# Patient Record
Sex: MALE | Race: BLACK OR AFRICAN AMERICAN | Employment: PART TIME | URBAN - METROPOLITAN AREA
[De-identification: names, ages, dates, MRNs, and addresses within clinical notes are randomized per-mention and may not be internally consistent; named-entity substitution may affect disease eponyms.]

---

## 2020-12-11 ENCOUNTER — OFFICE VISIT (OUTPATIENT)
Dept: PRIMARY CARE CLINIC | Age: 30
End: 2020-12-11

## 2020-12-11 VITALS
HEART RATE: 82 BPM | SYSTOLIC BLOOD PRESSURE: 105 MMHG | RESPIRATION RATE: 20 BRPM | OXYGEN SATURATION: 98 % | BODY MASS INDEX: 23.62 KG/M2 | HEIGHT: 70 IN | WEIGHT: 165 LBS | DIASTOLIC BLOOD PRESSURE: 55 MMHG | TEMPERATURE: 97.5 F

## 2020-12-11 DIAGNOSIS — Z20.822 EXPOSURE TO COVID-19 VIRUS: ICD-10-CM

## 2020-12-11 LAB
Lab: NORMAL
QC PASS/FAIL: NORMAL
SARS-COV-2, POC: NORMAL

## 2020-12-11 PROCEDURE — 87426 SARSCOV CORONAVIRUS AG IA: CPT | Performed by: NURSE PRACTITIONER

## 2020-12-11 PROCEDURE — 99213 OFFICE O/P EST LOW 20 MIN: CPT | Performed by: NURSE PRACTITIONER

## 2020-12-11 NOTE — PROGRESS NOTES
Chief Complaint   Cough (x 3 days) and Congestion      History of Present Illness   Source of history provided by:  patient. Dinora Santillan is a 35 y.o. old male who presents to the flu clinic with complaints of cough and congestion x3 days. States he was exposed to COVID-19 at home. Has been taking OTC cough medication with good relief. States that symptoms have been improving. Denies any history of asthma. He does smoke 2 block miles a day. He denies any current fever, chills, chest pain, shortness of breath, nausea, vomiting, diarrhea, abdominal pain or acute loss of taste/smell. ROS   Pertinent positives and negatives are stated within HPI, all other systems reviewed and are negative. Past Medical History:  has a past medical history of Trichimoniasis. Past Surgical History:  has no past surgical history on file. Social History:  reports that he has been smoking. He has never used smokeless tobacco. He reports current alcohol use of about 3.0 standard drinks of alcohol per week. He reports that he does not use drugs. Family History: family history is not on file. Allergies: Patient has no known allergies. Physical Exam   Vital Signs:  BP (!) 105/55 (Site: Left Upper Arm, Position: Sitting, Cuff Size: Large Adult)   Pulse 82   Temp 97.5 °F (36.4 °C) (Temporal)   Resp 20   Ht 5' 9.5\" (1.765 m)   Wt 165 lb (74.8 kg)   SpO2 98%   BMI 24.02 kg/m²    Oxygen Saturation Interpretation: Normal.    Constitutional:  Alert, development consistent with age. NAD. Head:  NC/NT. Airway patent. Ears: TMs clear bilaterally. Canals without exudate or swelling bilaterally. Mouth: Posterior pharynx with mild erythema and clear postnasal drip. No tonsillar hypertrophy or exudate. Neck:  Normal ROM. Supple. No anterior cervical adenopathy noted. Lungs: CTAB without wheezes, rales, or rhonchi.    CV:  Regular rate and rhythm, normal heart sounds, without pathological murmurs, ectopy, gallops, or

## 2020-12-12 LAB — SARS-COV-2, PCR: NOT DETECTED

## 2021-03-27 ENCOUNTER — APPOINTMENT (OUTPATIENT)
Dept: GENERAL RADIOLOGY | Age: 31
End: 2021-03-27
Payer: MEDICAID

## 2021-03-27 ENCOUNTER — HOSPITAL ENCOUNTER (EMERGENCY)
Age: 31
Discharge: HOME | End: 2021-03-27
Payer: MEDICAID

## 2021-03-27 VITALS
WEIGHT: 175 LBS | BODY MASS INDEX: 25.05 KG/M2 | RESPIRATION RATE: 16 BRPM | HEART RATE: 86 BPM | SYSTOLIC BLOOD PRESSURE: 119 MMHG | TEMPERATURE: 97 F | DIASTOLIC BLOOD PRESSURE: 82 MMHG | OXYGEN SATURATION: 97 % | HEIGHT: 70 IN

## 2021-03-27 DIAGNOSIS — S69.91XA FINGER INJURY, RIGHT, INITIAL ENCOUNTER: Primary | ICD-10-CM

## 2021-03-27 PROCEDURE — 73130 X-RAY EXAM OF HAND: CPT

## 2021-03-27 PROCEDURE — 99283 EMERGENCY DEPT VISIT LOW MDM: CPT

## 2021-03-27 PROCEDURE — 6370000000 HC RX 637 (ALT 250 FOR IP): Performed by: PHYSICIAN ASSISTANT

## 2021-03-27 RX ORDER — IBUPROFEN 800 MG/1
800 TABLET ORAL EVERY 8 HOURS PRN
Qty: 21 TABLET | Refills: 0 | Status: SHIPPED | OUTPATIENT
Start: 2021-03-27 | End: 2022-05-04

## 2021-03-27 RX ORDER — IBUPROFEN 400 MG/1
600 TABLET ORAL ONCE
Status: COMPLETED | OUTPATIENT
Start: 2021-03-27 | End: 2021-03-27

## 2021-03-27 RX ADMIN — IBUPROFEN 600 MG: 400 TABLET, FILM COATED ORAL at 19:19

## 2021-03-27 ASSESSMENT — PAIN DESCRIPTION - PAIN TYPE: TYPE: ACUTE PAIN

## 2021-03-27 ASSESSMENT — PAIN SCALES - GENERAL: PAINLEVEL_OUTOF10: 8

## 2021-03-27 ASSESSMENT — PAIN DESCRIPTION - LOCATION: LOCATION: FINGER (COMMENT WHICH ONE)

## 2021-03-27 NOTE — ED PROVIDER NOTES
[address]  Department of Emergency Medicine   ED  Encounter Note  Admit Date/RoomTime: 3/27/2021  6:53 PM  ED Room: [facility]    NAME: Jean Carlos Serrano  : 1990  MRN: [id number]     Chief Complaint:  Finger Injury (right pinky finger)    History of Present Illness        Jean Carlos Serrano is a 28 y.o. old male presenting to the emergency department by private vehicle, for persistent right finger pain after injury yesterday. Patient states it was stepped on multiple times. Patient states his symptoms are mild in severity describes as an aching pain. Patient states the pain is worse with movement. Patient denies anything making it better. Patient is right-hand dominant. Patient denies previous injury. Patient denies any other complaint or injury at this time. ROS   Pertinent positives and negatives are stated within HPI, all other systems reviewed and are negative. Past Medical History:  has a past medical history of Trichimoniasis. Surgical History:  has no past surgical history on file. Social History:  reports that he has been smoking cigars. He has never used smokeless tobacco. He reports current alcohol use of about 3.0 standard drinks of alcohol per week. He reports that he does not use drugs. Family History: family history is not on file. Allergies: Patient has no known allergies. Physical Exam   Oxygen Saturation Interpretation: Normal.        ED Triage Vitals   BP Temp Temp src Pulse Resp SpO2 Height Weight   21 1852 21 1852 -- 21 1847 21 1852 21 1847 21 18521 185   119/82 97 °F (36.1 °C)  86 16 97 % 5' 10\" (1.778 m) 175 lb (79.4 kg)         Constitutional:  Alert, development consistent with age. Neck:  Normal ROM. Supple. Non-tender. Fingers:  Right Little finger             Tenderness:  mild. Swelling: Mild.             Deformity: no.              ROM: diminished range with pain.            Skin:  normal exam; no wounds, erythema, or swelling. Neurovascular: Motor deficit: none. Sensory deficit:   none. Pulse deficit: none. Capillary refill: normal.  Hand: Right dorsal & volar. Tenderness: none. Swelling: None. Deformity: no.             Skin:  normal exam; no wounds, erythema, or swelling. Wrist:               Tenderness:  none. Swelling: None. Deformity: no.             ROM: full range of motion. Skin:  normal exam; no wounds, erythema, or swelling. Lymphatics: No lymphangitis or adenopathy noted. Neurological:  Oriented. Motor functions intact. t. Lab / Imaging Results   (All laboratory and radiology results have been personally reviewed by myself)  Labs:  No results found for this visit on 03/27/21. Imaging: All Radiology results interpreted by Radiologist unless otherwise noted. XR HAND RIGHT (MIN 3 VIEWS)   Final Result   1. Persistent flexion of proximal interphalangeal joint of the 5th digit. 2.  No fracture or dislocation. ED Course / Medical Decision Making     Medications   ibuprofen (ADVIL;MOTRIN) tablet 600 mg (600 mg Oral Given 3/27/21 1919)        Consult(s):   None    Procedure(s):   none    MDM:      Patient presenting with right fifth digit pain. Patient is in no acute distress, afebrile, nontoxic appearance. Patient's x-ray negative for fracture. Patient will be placed in a finger splint and recommended follow-up with orthopedics. Patient was able to extend his finger once he was placed in the splint. Discussed supportive care with patient. Recommended patient return to the ED with new or worsening of symptoms. Plan of Care/Counseling:  I reviewed today's visit with the patient in addition to providing specific details for the plan of care and counseling regarding the diagnosis and prognosis. Questions are answered at this time and are agreeable with the plan. Assessment      1. Finger injury, right, initial encounter      Plan   Discharge home. Patient condition is stable    New Medications     New Prescriptions    IBUPROFEN (IBU) 800 MG TABLET    Take 1 tablet by mouth every 8 hours as needed for Pain     Electronically signed by Meenu Walker PA-C   DD: 3/27/21  **This report was transcribed using voice recognition software. Every effort was made to ensure accuracy; however, inadvertent computerized transcription errors may be present.   END OF ED PROVIDER NOTE     Meenu Walker PA-C  03/27/21 [id number]

## 2021-04-10 ENCOUNTER — APPOINTMENT (OUTPATIENT)
Dept: GENERAL RADIOLOGY | Age: 31
End: 2021-04-10
Payer: MEDICAID

## 2021-04-10 ENCOUNTER — HOSPITAL ENCOUNTER (EMERGENCY)
Age: 31
Discharge: HOME | End: 2021-04-11
Attending: EMERGENCY MEDICINE
Payer: MEDICAID

## 2021-04-10 DIAGNOSIS — M25.441 FINGER JOINT SWELLING, RIGHT: Primary | ICD-10-CM

## 2021-04-10 PROCEDURE — 73130 X-RAY EXAM OF HAND: CPT

## 2021-04-10 PROCEDURE — 99284 EMERGENCY DEPT VISIT MOD MDM: CPT

## 2021-04-11 VITALS
RESPIRATION RATE: 18 BRPM | TEMPERATURE: 98 F | WEIGHT: 175 LBS | SYSTOLIC BLOOD PRESSURE: 145 MMHG | BODY MASS INDEX: 25.05 KG/M2 | OXYGEN SATURATION: 100 % | DIASTOLIC BLOOD PRESSURE: 62 MMHG | HEIGHT: 70 IN | HEART RATE: 74 BPM

## 2021-04-11 ASSESSMENT — ENCOUNTER SYMPTOMS
DIARRHEA: 0
ABDOMINAL PAIN: 0
VOMITING: 0
NAUSEA: 0
BACK PAIN: 0
WHEEZING: 0
COUGH: 0
SHORTNESS OF BREATH: 0

## 2021-04-11 NOTE — ED PROVIDER NOTES
[facility]  Department of Emergency Medicine     Written by: Johnathan Sherman DO  Patient Name: Berenice Bernard  Attending Provider: Roger Morales DO  Admit Date: 4/10/2021 11:18 PM  MRN: [id number]                   : 1990        Chief Complaint   Patient presents with    Hand Injury     was here with a patient, patient became agressive and pulled his finger splint off, wants new finger splint    - Chief complaint    Patient is a 81-year-old male no significant past medical history. Patient presents chief complaint of right fifth finger pain. Patient states that he initially injured the finger about 1 month ago. He states that he fell his hand was trampled on by bystanders. Patient noted immediate pain to the right finger. He states that he presented himself to the emergency department where x-rays were obtained demonstrate no acute fractures. Patient was provided with aluminum finger splint which he has been wearing since that time. 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 He presented tonight for worsening pain and swelling of the finger. Patient describes the pain as a throbbing sensation. Currently rates pain a 4 out of 10. States the pain is worsened with palpation and improved with rest.  Patient denies any fevers, chills, nausea, vomiting, chest pain, cough or shortness of breath. The history is provided by the patient. No  was used. Review of Systems   Constitutional: Negative for chills and fever. Respiratory: Negative for cough, shortness of breath and wheezing. Cardiovascular: Negative for chest pain. Gastrointestinal: Negative for abdominal pain, diarrhea, nausea and vomiting. Genitourinary: Negative for dysuria and frequency. Musculoskeletal: Positive for arthralgias and joint swelling. Negative for back pain. Skin: Negative for rash and wound. Neurological: Negative for weakness and headaches. All other systems reviewed and are negative. Physical Exam  Vitals signs and nursing note reviewed. Constitutional:       Appearance: He is well-developed. HENT:      Head: Normocephalic and atraumatic. Eyes:      Conjunctiva/sclera: Conjunctivae normal.   Neck:      Musculoskeletal: Normal range of motion and neck supple. Cardiovascular:      Rate and Rhythm: Normal rate and regular rhythm. Heart sounds: Normal heart sounds. No murmur. Pulmonary:      Effort: Pulmonary effort is normal. No respiratory distress. Breath sounds: Normal breath sounds. No wheezing or rales. Abdominal:      General: Bowel sounds are normal.      Palpations: Abdomen is soft. Tenderness: There is no abdominal tenderness. There is no guarding or rebound. Musculoskeletal:         General: No deformity. Right hand: He exhibits decreased range of motion and tenderness. Comments: On examination right hand there is swelling and decreased range of motion to the proximal interphalangeal joint, mild pain, flexion extension mechanism appear to be intact to the limited secondary to swelling. Right hand is neurovascular intact, radial and ulnar pulses palpable. Sensation intact to light touch. Skin:     General: Skin is warm and dry. Neurological:      Mental Status: He is alert and oriented to person, place, and time. Cranial Nerves: No cranial nerve deficit. Coordination: Coordination normal.          Procedures       MDM  Number of Diagnoses or Management Options  Finger joint swelling, right  Diagnosis management comments: Patient is a 21-year-old male no sniffing past medical history. Patient with chief complaint of right finger pain. Vital signs stable presentation.   Physical exam heart regular rate and rhythm, lungs clear to station bilaterally, examination of the right hand there is swelling and tenderness to the proximal interphalangeal joint of the right fifth finger. Flexion extension mechanisms intact, sensation intact to light touch, radial and ulnar pulses 2+. Right hand x-rays obtained demonstrate no acute fractures however there was soft tissue edema adjacent to the PIP of the fifth digit. Findings consistent with right proximal to phalangeal joint effusion secondary to trauma. Decision made to discharge patient. Patient was given aluminum splint but was encouraged to do active range of motion L at rest.  In addition patient was instructed apply ice or heat to the area and 20-minute intervals. Due to the persistent effusion patient was given follow-up with orthopedic surgery. Plan of care discussed with patient occluding discharge, all questions were answered, patient was in agreement with plan of care and discharged home in stable condition. Amount and/or Complexity of Data Reviewed  Tests in the radiology section of CPT®: ordered and reviewed    Risk of Complications, Morbidity, and/or Mortality  Presenting problems: low  Diagnostic procedures: low  Management options: low    Patient Progress  Patient progress: stable           --------------------------------------------- PAST HISTORY ---------------------------------------------  Past Medical History:  has a past medical history of Trichimoniasis. Past Surgical History:  has no past surgical history on file. Social History:  reports that he has been smoking cigars. He has never used smokeless tobacco. He reports current alcohol use of about 3.0 standard drinks of alcohol per week. He reports that he does not use drugs. Family History: family history is not on file. The patients home medications have been reviewed. Allergies: Patient has no known allergies.     -------------------------------------------------- RESULTS -------------------------------------------------  Labs:  No results found for this visit on 04/10/21. Radiology:  XR HAND RIGHT (MIN 3 VIEWS)   Final Result   No acute osseous abnormality. Soft tissue edema adjacent to the PIP joint 5th digit.             ------------------------- NURSING NOTES AND VITALS REVIEWED ---------------------------  Date / Time Roomed:  4/10/2021 11:18 PM  ED Bed Assignment:  17/17    The nursing notes within the ED encounter and vital signs as below have been reviewed. BP (!) 145/62   Pulse 74   Temp 98 °F (36.7 °C) (Oral)   Resp 18   Ht 5' 10\" (1.778 m)   Wt 175 lb (79.4 kg)   SpO2 100%   BMI 25.11 kg/m²   Oxygen Saturation Interpretation: Normal      ------------------------------------------ PROGRESS NOTES ------------------------------------------  1:09 AM EDT  I have spoken with the patient and discussed todays results, in addition to providing specific details for the plan of care and counseling regarding the diagnosis and prognosis. Their questions are answered at this time and they are agreeable with the plan. I discussed at length with them reasons for immediate return here for re evaluation. They will followup with their primary care physician and orthopedic physician by calling their office on Monday.      --------------------------------- ADDITIONAL PROVIDER NOTES ---------------------------------  At this time the patient is without objective evidence of an acute process requiring hospitalization or inpatient management. They have remained hemodynamically stable throughout their entire ED visit and are stable for discharge with outpatient follow-up. The plan has been discussed in detail and they are aware of the specific conditions for emergent return, as well as the importance of follow-up. Discharge Medication List as of 4/11/2021 12:46 AM          Diagnosis:  1. Finger joint swelling, right        Disposition:  Patient's disposition: Discharge to home  Patient's condition is stable.       Patient was seen and evaluated by myself and my

## 2022-05-03 ENCOUNTER — APPOINTMENT (OUTPATIENT)
Dept: GENERAL RADIOLOGY | Age: 32
DRG: 137 | End: 2022-05-03
Payer: MEDICAID

## 2022-05-03 LAB
ALBUMIN SERPL-MCNC: 4.7 G/DL (ref 3.5–5.2)
ALP BLD-CCNC: 63 U/L (ref 40–129)
ALT SERPL-CCNC: 31 U/L (ref 0–40)
ANION GAP SERPL CALCULATED.3IONS-SCNC: 16 MMOL/L (ref 7–16)
AST SERPL-CCNC: 43 U/L (ref 0–39)
BACTERIA: ABNORMAL /HPF
BASOPHILS ABSOLUTE: 0.03 E9/L (ref 0–0.2)
BASOPHILS RELATIVE PERCENT: 0.6 % (ref 0–2)
BILIRUB SERPL-MCNC: 0.3 MG/DL (ref 0–1.2)
BILIRUBIN URINE: ABNORMAL
BLOOD, URINE: ABNORMAL
BUN BLDV-MCNC: 12 MG/DL (ref 6–20)
CALCIUM SERPL-MCNC: 9.1 MG/DL (ref 8.6–10.2)
CHLORIDE BLD-SCNC: 98 MMOL/L (ref 98–107)
CLARITY: ABNORMAL
CO2: 23 MMOL/L (ref 22–29)
COLOR: YELLOW
CREAT SERPL-MCNC: 1.2 MG/DL (ref 0.7–1.2)
EOSINOPHILS ABSOLUTE: 0 E9/L (ref 0.05–0.5)
EOSINOPHILS RELATIVE PERCENT: 0 % (ref 0–6)
EPITHELIAL CELLS, UA: ABNORMAL /HPF
GFR AFRICAN AMERICAN: >60
GFR NON-AFRICAN AMERICAN: >60 ML/MIN/1.73
GLUCOSE BLD-MCNC: 102 MG/DL (ref 74–99)
GLUCOSE URINE: NEGATIVE MG/DL
HCT VFR BLD CALC: 47.4 % (ref 37–54)
HEMOGLOBIN: 17 G/DL (ref 12.5–16.5)
IMMATURE GRANULOCYTES #: 0.02 E9/L
IMMATURE GRANULOCYTES %: 0.4 % (ref 0–5)
INFLUENZA A BY PCR: NOT DETECTED
INFLUENZA B BY PCR: NOT DETECTED
KETONES, URINE: 15 MG/DL
LEUKOCYTE ESTERASE, URINE: NEGATIVE
LIPASE: 64 U/L (ref 13–60)
LYMPHOCYTES ABSOLUTE: 0.98 E9/L (ref 1.5–4)
LYMPHOCYTES RELATIVE PERCENT: 19.5 % (ref 20–42)
MCH RBC QN AUTO: 31.4 PG (ref 26–35)
MCHC RBC AUTO-ENTMCNC: 35.9 % (ref 32–34.5)
MCV RBC AUTO: 87.6 FL (ref 80–99.9)
MONOCYTES ABSOLUTE: 0.52 E9/L (ref 0.1–0.95)
MONOCYTES RELATIVE PERCENT: 10.3 % (ref 2–12)
MUCUS: PRESENT /LPF
NEUTROPHILS ABSOLUTE: 3.48 E9/L (ref 1.8–7.3)
NEUTROPHILS RELATIVE PERCENT: 69.2 % (ref 43–80)
NITRITE, URINE: NEGATIVE
PDW BLD-RTO: 12.3 FL (ref 11.5–15)
PH UA: 5.5 (ref 5–9)
PLATELET # BLD: 121 E9/L (ref 130–450)
PMV BLD AUTO: 13 FL (ref 7–12)
POTASSIUM SERPL-SCNC: 3.6 MMOL/L (ref 3.5–5)
PROTEIN UA: 30 MG/DL
RBC # BLD: 5.41 E12/L (ref 3.8–5.8)
RBC UA: ABNORMAL /HPF (ref 0–2)
SARS-COV-2, NAAT: DETECTED
SODIUM BLD-SCNC: 137 MMOL/L (ref 132–146)
SPECIFIC GRAVITY UA: >=1.03 (ref 1–1.03)
TOTAL PROTEIN: 7.6 G/DL (ref 6.4–8.3)
UROBILINOGEN, URINE: 1 E.U./DL
WBC # BLD: 5 E9/L (ref 4.5–11.5)
WBC UA: ABNORMAL /HPF (ref 0–5)

## 2022-05-03 PROCEDURE — 85025 COMPLETE CBC W/AUTO DIFF WBC: CPT

## 2022-05-03 PROCEDURE — 96361 HYDRATE IV INFUSION ADD-ON: CPT

## 2022-05-03 PROCEDURE — 6370000000 HC RX 637 (ALT 250 FOR IP): Performed by: NURSE PRACTITIONER

## 2022-05-03 PROCEDURE — 96375 TX/PRO/DX INJ NEW DRUG ADDON: CPT

## 2022-05-03 PROCEDURE — 83690 ASSAY OF LIPASE: CPT

## 2022-05-03 PROCEDURE — 99285 EMERGENCY DEPT VISIT HI MDM: CPT

## 2022-05-03 PROCEDURE — 81001 URINALYSIS AUTO W/SCOPE: CPT

## 2022-05-03 PROCEDURE — 80053 COMPREHEN METABOLIC PANEL: CPT

## 2022-05-03 PROCEDURE — 87502 INFLUENZA DNA AMP PROBE: CPT

## 2022-05-03 PROCEDURE — 71045 X-RAY EXAM CHEST 1 VIEW: CPT

## 2022-05-03 PROCEDURE — 96374 THER/PROPH/DIAG INJ IV PUSH: CPT

## 2022-05-03 PROCEDURE — 96372 THER/PROPH/DIAG INJ SC/IM: CPT

## 2022-05-03 PROCEDURE — 87635 SARS-COV-2 COVID-19 AMP PRB: CPT

## 2022-05-03 RX ORDER — ONDANSETRON 4 MG/1
4 TABLET, ORALLY DISINTEGRATING ORAL ONCE
Status: COMPLETED | OUTPATIENT
Start: 2022-05-03 | End: 2022-05-03

## 2022-05-03 RX ADMIN — ONDANSETRON 4 MG: 4 TABLET, ORALLY DISINTEGRATING ORAL at 20:47

## 2022-05-03 ASSESSMENT — PAIN DESCRIPTION - LOCATION: LOCATION: ABDOMEN

## 2022-05-03 ASSESSMENT — PAIN DESCRIPTION - DESCRIPTORS: DESCRIPTORS: ACHING

## 2022-05-03 ASSESSMENT — PAIN DESCRIPTION - ORIENTATION: ORIENTATION: RIGHT;LEFT;MID

## 2022-05-03 ASSESSMENT — PAIN DESCRIPTION - PAIN TYPE: TYPE: ACUTE PAIN

## 2022-05-03 ASSESSMENT — PAIN SCALES - GENERAL: PAINLEVEL_OUTOF10: 7

## 2022-05-03 ASSESSMENT — PAIN DESCRIPTION - FREQUENCY: FREQUENCY: CONTINUOUS

## 2022-05-03 ASSESSMENT — PAIN - FUNCTIONAL ASSESSMENT: PAIN_FUNCTIONAL_ASSESSMENT: 0-10

## 2022-05-03 NOTE — ED NOTES
Department of Emergency Medicine  FIRST PROVIDER TRIAGE NOTE             Independent MLP           5/3/22  7:23 PM EDT    Date of Encounter: 5/3/22   MRN: [id number]      HPI: Ulysses Tejeda is a 33 y.o. male who presents to the ED for Fever, Abdominal Pain, Cough (for 2 days), Diarrhea, Chills, and Nausea      ROS: Negative for cp or sob. PE: Gen Appearance/Constitutional: alert  GI: soft and NT     Initial Plan of Care: All treatment areas with department are currently occupied. Plan to order/Initiate the following while awaiting opening in ED: labs and imaging studies.   Initiate Treatment-Testing, Proceed toTreatment Area When Bed Available for ED Attending/MLP to Continue Care    Electronically signed by MACK Shell CNP   DD: 5/3/22         MACK Shell CNP  05/03/22 [id number]

## 2022-05-04 ENCOUNTER — HOSPITAL ENCOUNTER (INPATIENT)
Age: 32
LOS: 1 days | Discharge: HOME | DRG: 137 | End: 2022-05-05
Attending: EMERGENCY MEDICINE | Admitting: INTERNAL MEDICINE
Payer: MEDICAID

## 2022-05-04 ENCOUNTER — APPOINTMENT (OUTPATIENT)
Dept: CT IMAGING | Age: 32
DRG: 137 | End: 2022-05-04
Payer: MEDICAID

## 2022-05-04 DIAGNOSIS — I26.99 PULMONARY EMBOLISM ASSOCIATED WITH COVID-19 (HCC): ICD-10-CM

## 2022-05-04 DIAGNOSIS — U07.1 PULMONARY EMBOLISM ASSOCIATED WITH COVID-19 (HCC): ICD-10-CM

## 2022-05-04 DIAGNOSIS — U07.1 COVID-19: Primary | ICD-10-CM

## 2022-05-04 LAB
C-REACTIVE PROTEIN: 0.7 MG/DL (ref 0–0.4)
D DIMER: 210 NG/ML DDU
EKG ATRIAL RATE: 57 BPM
EKG ATRIAL RATE: 68 BPM
EKG P AXIS: 52 DEGREES
EKG P AXIS: 57 DEGREES
EKG P-R INTERVAL: 126 MS
EKG P-R INTERVAL: 128 MS
EKG Q-T INTERVAL: 370 MS
EKG Q-T INTERVAL: 374 MS
EKG QRS DURATION: 78 MS
EKG QRS DURATION: 82 MS
EKG QTC CALCULATION (BAZETT): 364 MS
EKG QTC CALCULATION (BAZETT): 378 MS
EKG R AXIS: 76 DEGREES
EKG R AXIS: 89 DEGREES
EKG T AXIS: 10 DEGREES
EKG T AXIS: 48 DEGREES
EKG VENTRICULAR RATE: 57 BPM
EKG VENTRICULAR RATE: 63 BPM
FERRITIN: 838 NG/ML
LACTATE DEHYDROGENASE: 187 U/L (ref 135–225)
REASON FOR REJECTION: NORMAL
REJECTED TEST: NORMAL
TROPONIN, HIGH SENSITIVITY: 6 NG/L (ref 0–11)
TROPONIN, HIGH SENSITIVITY: 7 NG/L (ref 0–11)
TROPONIN, HIGH SENSITIVITY: 7 NG/L (ref 0–11)

## 2022-05-04 PROCEDURE — 82728 ASSAY OF FERRITIN: CPT

## 2022-05-04 PROCEDURE — 93010 ELECTROCARDIOGRAM REPORT: CPT | Performed by: INTERNAL MEDICINE

## 2022-05-04 PROCEDURE — 36415 COLL VENOUS BLD VENIPUNCTURE: CPT

## 2022-05-04 PROCEDURE — 74177 CT ABD & PELVIS W/CONTRAST: CPT

## 2022-05-04 PROCEDURE — 6360000002 HC RX W HCPCS: Performed by: NURSE PRACTITIONER

## 2022-05-04 PROCEDURE — 6370000000 HC RX 637 (ALT 250 FOR IP): Performed by: INTERNAL MEDICINE

## 2022-05-04 PROCEDURE — 93005 ELECTROCARDIOGRAM TRACING: CPT | Performed by: INTERNAL MEDICINE

## 2022-05-04 PROCEDURE — 6360000004 HC RX CONTRAST MEDICATION: Performed by: RADIOLOGY

## 2022-05-04 PROCEDURE — 85378 FIBRIN DEGRADE SEMIQUANT: CPT

## 2022-05-04 PROCEDURE — 71275 CT ANGIOGRAPHY CHEST: CPT

## 2022-05-04 PROCEDURE — 2060000000 HC ICU INTERMEDIATE R&B

## 2022-05-04 PROCEDURE — 2580000003 HC RX 258: Performed by: FAMILY MEDICINE

## 2022-05-04 PROCEDURE — 87088 URINE BACTERIA CULTURE: CPT

## 2022-05-04 PROCEDURE — 86140 C-REACTIVE PROTEIN: CPT

## 2022-05-04 PROCEDURE — 83615 LACTATE (LD) (LDH) ENZYME: CPT

## 2022-05-04 PROCEDURE — 6360000002 HC RX W HCPCS: Performed by: INTERNAL MEDICINE

## 2022-05-04 PROCEDURE — 2580000003 HC RX 258: Performed by: NURSE PRACTITIONER

## 2022-05-04 PROCEDURE — 84484 ASSAY OF TROPONIN QUANT: CPT

## 2022-05-04 PROCEDURE — 93005 ELECTROCARDIOGRAM TRACING: CPT | Performed by: NURSE PRACTITIONER

## 2022-05-04 PROCEDURE — 2580000003 HC RX 258: Performed by: INTERNAL MEDICINE

## 2022-05-04 RX ORDER — ACETAMINOPHEN 325 MG/1
650 TABLET ORAL EVERY 6 HOURS PRN
Status: DISCONTINUED | OUTPATIENT
Start: 2022-05-04 | End: 2022-05-04

## 2022-05-04 RX ORDER — ACETAMINOPHEN 650 MG/1
650 SUPPOSITORY RECTAL EVERY 6 HOURS PRN
Status: DISCONTINUED | OUTPATIENT
Start: 2022-05-04 | End: 2022-05-05 | Stop reason: HOSPADM

## 2022-05-04 RX ORDER — ONDANSETRON 4 MG/1
4 TABLET, ORALLY DISINTEGRATING ORAL EVERY 8 HOURS PRN
Status: DISCONTINUED | OUTPATIENT
Start: 2022-05-04 | End: 2022-05-04

## 2022-05-04 RX ORDER — POLYETHYLENE GLYCOL 3350 17 G/17G
17 POWDER, FOR SOLUTION ORAL DAILY PRN
Status: DISCONTINUED | OUTPATIENT
Start: 2022-05-04 | End: 2022-05-05 | Stop reason: HOSPADM

## 2022-05-04 RX ORDER — ONDANSETRON 2 MG/ML
4 INJECTION INTRAMUSCULAR; INTRAVENOUS EVERY 6 HOURS PRN
Status: DISCONTINUED | OUTPATIENT
Start: 2022-05-04 | End: 2022-05-04

## 2022-05-04 RX ORDER — ONDANSETRON 2 MG/ML
4 INJECTION INTRAMUSCULAR; INTRAVENOUS ONCE
Status: COMPLETED | OUTPATIENT
Start: 2022-05-04 | End: 2022-05-04

## 2022-05-04 RX ORDER — SODIUM CHLORIDE 0.9 % (FLUSH) 0.9 %
5-40 SYRINGE (ML) INJECTION PRN
Status: DISCONTINUED | OUTPATIENT
Start: 2022-05-04 | End: 2022-05-04

## 2022-05-04 RX ORDER — ENOXAPARIN SODIUM 100 MG/ML
1 INJECTION SUBCUTANEOUS EVERY 12 HOURS
Status: DISCONTINUED | OUTPATIENT
Start: 2022-05-04 | End: 2022-05-04 | Stop reason: SDUPTHER

## 2022-05-04 RX ORDER — ENOXAPARIN SODIUM 100 MG/ML
40 INJECTION SUBCUTANEOUS DAILY
Status: DISCONTINUED | OUTPATIENT
Start: 2022-05-04 | End: 2022-05-04

## 2022-05-04 RX ORDER — 0.9 % SODIUM CHLORIDE 0.9 %
1000 INTRAVENOUS SOLUTION INTRAVENOUS ONCE
Status: COMPLETED | OUTPATIENT
Start: 2022-05-04 | End: 2022-05-04

## 2022-05-04 RX ORDER — KETOROLAC TROMETHAMINE 30 MG/ML
30 INJECTION, SOLUTION INTRAMUSCULAR; INTRAVENOUS ONCE
Status: COMPLETED | OUTPATIENT
Start: 2022-05-04 | End: 2022-05-04

## 2022-05-04 RX ORDER — SODIUM CHLORIDE 9 MG/ML
INJECTION, SOLUTION INTRAVENOUS PRN
Status: DISCONTINUED | OUTPATIENT
Start: 2022-05-04 | End: 2022-05-05 | Stop reason: HOSPADM

## 2022-05-04 RX ORDER — ENOXAPARIN SODIUM 100 MG/ML
1 INJECTION SUBCUTANEOUS 2 TIMES DAILY
Status: DISCONTINUED | OUTPATIENT
Start: 2022-05-04 | End: 2022-05-05 | Stop reason: HOSPADM

## 2022-05-04 RX ORDER — ENOXAPARIN SODIUM 100 MG/ML
1 INJECTION SUBCUTANEOUS ONCE
Status: COMPLETED | OUTPATIENT
Start: 2022-05-04 | End: 2022-05-04

## 2022-05-04 RX ORDER — ACETAMINOPHEN 325 MG/1
650 TABLET ORAL EVERY 6 HOURS PRN
Status: DISCONTINUED | OUTPATIENT
Start: 2022-05-04 | End: 2022-05-05 | Stop reason: HOSPADM

## 2022-05-04 RX ORDER — ONDANSETRON 4 MG/1
4 TABLET, ORALLY DISINTEGRATING ORAL EVERY 8 HOURS PRN
Status: DISCONTINUED | OUTPATIENT
Start: 2022-05-04 | End: 2022-05-05 | Stop reason: HOSPADM

## 2022-05-04 RX ORDER — SODIUM CHLORIDE 9 MG/ML
INJECTION, SOLUTION INTRAVENOUS PRN
Status: DISCONTINUED | OUTPATIENT
Start: 2022-05-04 | End: 2022-05-04

## 2022-05-04 RX ORDER — ONDANSETRON 2 MG/ML
4 INJECTION INTRAMUSCULAR; INTRAVENOUS EVERY 6 HOURS PRN
Status: DISCONTINUED | OUTPATIENT
Start: 2022-05-04 | End: 2022-05-05 | Stop reason: HOSPADM

## 2022-05-04 RX ORDER — SODIUM CHLORIDE 0.9 % (FLUSH) 0.9 %
5-40 SYRINGE (ML) INJECTION EVERY 12 HOURS SCHEDULED
Status: DISCONTINUED | OUTPATIENT
Start: 2022-05-04 | End: 2022-05-04

## 2022-05-04 RX ORDER — ACETAMINOPHEN 650 MG/1
650 SUPPOSITORY RECTAL EVERY 6 HOURS PRN
Status: DISCONTINUED | OUTPATIENT
Start: 2022-05-04 | End: 2022-05-04

## 2022-05-04 RX ORDER — SODIUM CHLORIDE 0.9 % (FLUSH) 0.9 %
5-40 SYRINGE (ML) INJECTION EVERY 12 HOURS SCHEDULED
Status: DISCONTINUED | OUTPATIENT
Start: 2022-05-04 | End: 2022-05-05 | Stop reason: HOSPADM

## 2022-05-04 RX ORDER — MORPHINE SULFATE 4 MG/ML
4 INJECTION, SOLUTION INTRAMUSCULAR; INTRAVENOUS ONCE
Status: COMPLETED | OUTPATIENT
Start: 2022-05-04 | End: 2022-05-04

## 2022-05-04 RX ORDER — POLYETHYLENE GLYCOL 3350 17 G/17G
17 POWDER, FOR SOLUTION ORAL DAILY PRN
Status: DISCONTINUED | OUTPATIENT
Start: 2022-05-04 | End: 2022-05-04

## 2022-05-04 RX ORDER — SODIUM CHLORIDE 0.9 % (FLUSH) 0.9 %
5-40 SYRINGE (ML) INJECTION PRN
Status: DISCONTINUED | OUTPATIENT
Start: 2022-05-04 | End: 2022-05-05 | Stop reason: HOSPADM

## 2022-05-04 RX ADMIN — SODIUM CHLORIDE, PRESERVATIVE FREE 10 ML: 5 INJECTION INTRAVENOUS at 10:22

## 2022-05-04 RX ADMIN — SODIUM CHLORIDE 1000 ML: 9 INJECTION, SOLUTION INTRAVENOUS at 01:54

## 2022-05-04 RX ADMIN — MORPHINE SULFATE 4 MG: 4 INJECTION, SOLUTION INTRAMUSCULAR; INTRAVENOUS at 01:56

## 2022-05-04 RX ADMIN — POLYETHYLENE GLYCOL 3350 17 G: 17 POWDER, FOR SOLUTION ORAL at 17:48

## 2022-05-04 RX ADMIN — ENOXAPARIN SODIUM 90 MG: 100 INJECTION SUBCUTANEOUS at 17:45

## 2022-05-04 RX ADMIN — SODIUM CHLORIDE, PRESERVATIVE FREE 5 ML: 5 INJECTION INTRAVENOUS at 21:00

## 2022-05-04 RX ADMIN — ONDANSETRON 4 MG: 2 INJECTION INTRAMUSCULAR; INTRAVENOUS at 17:45

## 2022-05-04 RX ADMIN — IOPAMIDOL 90 ML: 755 INJECTION, SOLUTION INTRAVENOUS at 02:08

## 2022-05-04 RX ADMIN — KETOROLAC TROMETHAMINE 30 MG: 30 INJECTION, SOLUTION INTRAMUSCULAR at 04:19

## 2022-05-04 RX ADMIN — ENOXAPARIN SODIUM 90 MG: 100 INJECTION SUBCUTANEOUS at 05:20

## 2022-05-04 RX ADMIN — ONDANSETRON 4 MG: 2 INJECTION INTRAMUSCULAR; INTRAVENOUS at 01:56

## 2022-05-04 ASSESSMENT — PAIN SCALES - GENERAL
PAINLEVEL_OUTOF10: 7
PAINLEVEL_OUTOF10: 0
PAINLEVEL_OUTOF10: 3
PAINLEVEL_OUTOF10: 0

## 2022-05-04 NOTE — PROGRESS NOTES
Dr. Rayshawn Browning notified via perfect serve of patient complaining of chest pain. Vital signs stable and EKG obtained. No new orders.

## 2022-05-04 NOTE — ED PROVIDER NOTES
ED Physician   HPI:  5/4/22, Time: 1:00 AM EDT         Bo Woods is a 33 y.o. male presenting to the ED for 2-day history of widespread body aches and pains, fevers, chills, abdominal pain, feeling congested as well as having a cough, chest pain, and now nausea, vomiting and diarrhea. Patient denies any illness exposure that he is aware of. On arrival here patient noted to have a temp of 100.7. He last took Tylenol 1 day prior, he did not take any meds today. Patient did not receive any of his COVID-19 vaccinations. Patient with active emesis in triage. Also does have a harsh moist productive cough reporting he is bringing up clear to yellow-colored sputum. He does report abdominal pain throughout his entire abdomen. He denies any blood noted in his urine, stool or emesis. He also reports pain in his chest but states it is more associated with his cough. Patient reports otherwise normal state of health. He is a smoker. Patient believes he might have food poisoning because symptoms started shortly after eating. Patient with symptoms moderate in severity and persistent  Review of Systems:   A complete review of systems was performed and pertinent positives and negatives are stated within HPI, all other systems reviewed and are negative.          --------------------------------------------- PAST HISTORY ---------------------------------------------  Past Medical History:  has a past medical history of Trichimoniasis. Past Surgical History:  has no past surgical history on file. Social History:  reports that he has been smoking cigars. He has never used smokeless tobacco. He reports current alcohol use of about 3.0 standard drinks of alcohol per week. He reports that he does not use drugs. Family History: family history is not on file. The patients home medications have been reviewed. Allergies: Patient has no known allergies.     -------------------------------------------------- RESULTS -------------------------------------------------  All laboratory and radiology results have been personally reviewed by myself   LABS:  Results for orders placed or performed during the hospital encounter of 05/04/22   COVID-19, Rapid    Specimen: Nasopharyngeal Swab   Result Value Ref Range    SARS-CoV-2, NAAT DETECTED (A) Not Detected   Rapid influenza A/B antigens    Specimen: Nasopharyngeal   Result Value Ref Range    Influenza A by PCR Not Detected Not Detected    Influenza B by PCR Not Detected Not Detected   CBC with Auto Differential   Result Value Ref Range    WBC 5.0 4.5 - 11.5 E9/L    RBC 5.41 3.80 - 5.80 E12/L    Hemoglobin 17.0 (H) 12.5 - 16.5 g/dL    Hematocrit 47.4 37.0 - 54.0 %    MCV 87.6 80.0 - 99.9 fL    MCH 31.4 26.0 - 35.0 pg    MCHC 35.9 (H) 32.0 - 34.5 %    RDW 12.3 11.5 - 15.0 fL    Platelets 970 (L) 123 - 450 E9/L    MPV 13.0 (H) 7.0 - 12.0 fL    Neutrophils % 69.2 43.0 - 80.0 %    Immature Granulocytes % 0.4 0.0 - 5.0 %    Lymphocytes % 19.5 (L) 20.0 - 42.0 %    Monocytes % 10.3 2.0 - 12.0 %    Eosinophils % 0.0 0.0 - 6.0 %    Basophils % 0.6 0.0 - 2.0 %    Neutrophils Absolute 3.48 1.80 - 7.30 E9/L    Immature Granulocytes # 0.02 E9/L    Lymphocytes Absolute 0.98 (L) 1.50 - 4.00 E9/L    Monocytes Absolute 0.52 0.10 - 0.95 E9/L    Eosinophils Absolute 0.00 (L) 0.05 - 0.50 E9/L    Basophils Absolute 0.03 0.00 - 0.20 E9/L   Comprehensive Metabolic Panel   Result Value Ref Range    Sodium 137 132 - 146 mmol/L    Potassium 3.6 3.5 - 5.0 mmol/L    Chloride 98 98 - 107 mmol/L    CO2 23 22 - 29 mmol/L    Anion Gap 16 7 - 16 mmol/L    Glucose 102 (H) 74 - 99 mg/dL    BUN 12 6 - 20 mg/dL    CREATININE 1.2 0.7 - 1.2 mg/dL    GFR Non-African American >60 >=60 mL/min/1.73    GFR African American >60     Calcium 9.1 8.6 - 10.2 mg/dL    Total Protein 7.6 6.4 - 8.3 g/dL    Albumin 4.7 3.5 - 5.2 g/dL    Total Bilirubin 0.3 0.0 - 1.2 mg/dL    Alkaline Phosphatase 63 40 - 129 U/L    ALT 31 0 - 40 U/L    AST 43 (H) 0 - 39 U/L   Lipase   Result Value Ref Range    Lipase 64 (H) 13 - 60 U/L   Urinalysis   Result Value Ref Range    Color, UA Yellow Straw/Yellow    Clarity, UA SL CLOUDY Clear    Glucose, Ur Negative Negative mg/dL    Bilirubin Urine SMALL (A) Negative    Ketones, Urine 15 (A) Negative mg/dL    Specific Gravity, UA >=1.030 1.005 - 1.030    Blood, Urine SMALL (A) Negative    pH, UA 5.5 5.0 - 9.0    Protein, UA 30 (A) Negative mg/dL    Urobilinogen, Urine 1.0 <2.0 E.U./dL    Nitrite, Urine Negative Negative    Leukocyte Esterase, Urine Negative Negative   Microscopic Urinalysis   Result Value Ref Range    Mucus, UA Present (A) None Seen /LPF    WBC, UA 5-10 (A) 0 - 5 /HPF    RBC, UA 0-1 0 - 2 /HPF    Epithelial Cells, UA FEW /HPF    Bacteria, UA FEW (A) None Seen /HPF   D-Dimer, Quantitative   Result Value Ref Range    D-Dimer, Quant 210 ng/mL DDU       RADIOLOGY:  Interpreted by Radiologist.  CTA CHEST W CONTRAST   Final Result   Lack of opacification of certain segmental branches of the lingula, raising   suspicion for pulmonary embolus. No evidence of a central or lobar embolus. No heart strain. No acute intra-abdominal or pelvic findings. CT ABDOMEN PELVIS W IV CONTRAST Additional Contrast? None   Final Result   Lack of opacification of certain segmental branches of the lingula, raising   suspicion for pulmonary embolus. No evidence of a central or lobar embolus. No heart strain. No acute intra-abdominal or pelvic findings. XR CHEST PORTABLE   Final Result   No evidence of active cardiopulmonary pathology. ------------------------- NURSING NOTES AND VITALS REVIEWED ---------------------------   The nursing notes within the ED encounter and vital signs as below have been reviewed.    /79   Pulse 74   Temp 98.7 °F (37.1 °C) (Oral)   Resp 25   Ht 5' 10\" (1.778 m)   Wt 190 lb (86.2 kg)   SpO2 97%   BMI 27.26 kg/m²   Oxygen Saturation Interpretation: Normal      ---------------------------------------------------PHYSICAL EXAM--------------------------------------      Constitutional/General: Alert and oriented x3, moderately uncomfortable  Head: Normocephalic and atraumatic  Eyes: PERRL, EOMI  Mouth: Oropharynx clear, handling secretions, no trismus  Neck: Supple, full ROM,   Pulmonary: Lungs clear to auscultation bilaterally, no wheezes, rales, or rhonchi. Not in respiratory distress  Cardiovascular: Tachycardic heart rate and rhythm, no murmurs, gallops, or rubs. 2+ distal pulses  Abdomen: Soft, tender, non distended, point tenderness to all areas of the abdomen on palpation. No unusual bulges or areas of pulsation. No active emesis in triage. Extremities: Moves all extremities x 4. Warm and well perfused, no lower extremity swelling noted. Skin: warm and dry without rash  Neurologic: GCS 15, cranial nerves II through XII grossly intact. No acute neurovascular deficit noted. Speech clear and coherent strength strong and equal bilaterally  Psych: Normal Affect      ------------------------------ ED COURSE/MEDICAL DECISION MAKING----------------------  Medications   ondansetron (ZOFRAN-ODT) disintegrating tablet 4 mg (4 mg Oral Given 5/3/22 2047)   0.9 % sodium chloride bolus (0 mLs IntraVENous Stopped 5/4/22 0520)   morphine sulfate (PF) injection 4 mg (4 mg IntraVENous Given 5/4/22 0156)   ondansetron (ZOFRAN) injection 4 mg (4 mg IntraVENous Given 5/4/22 0156)   iopamidol (ISOVUE-370) 76 % injection 90 mL (90 mLs IntraVENous Given 5/4/22 9079)   ketorolac (TORADOL) injection 30 mg (30 mg IntraVENous Given 5/4/22 2845)   enoxaparin (LOVENOX) injection 90 mg (90 mg SubCUTAneous Given 5/4/22 0520)         ED COURSE:  ED Course as of 05/04/22 0557   Wed May 04, 2022   0535 EKGEKG interpreted by emergency physicianEKG shows normal sinus rhythm sinus rhythm at 63 bpm.  Nonspecific ST and T wave changes.   No STEMI. [CB]      ED Course User Index  [CB] Gilbert Wei,        Medical Decision Making: Plan will be for labs will also obtain imaging and medicate for symptom relief. We will also obtain a rapid influenza as well as rapid COVID to rule out a as a source. Patient is not hypoxic, pulse ox noted to be 98% on room air. Labs resulted CBC negative, chemistry panel unremarkable, lipase just slightly elevated at 64 likely this is related to the repeated emesis, urinalysis negative. COVID-19 test is positive. Rapid influenza negative. Chest x-ray negative. Patient brought back to ED room 8. He was made aware of his positive COVID-19  diagnosis. Patient does still complain of abdominal pain, patient will have a CTA of the chest to rule out pulmonary embolism as well as CT abdomen pelvis. We will also medicate for symptom relief. CT of the chest with contrast resulted showing lack of opacification of certain segmental branches of the lingula raising suspicion for a pulmonary embolism. No evidence of any central or lobar embolus. No heart strain. Because of this finding plan will be for telemetry admission, will start patient on Lovenox 1 mg/kg. CT scan of the abdomen pelvis resulted showing no acute intra-abdominal or pelvic findings. Patient presenting with several day history of widespread body aches and pains, fevers, chest pain, abdominal pain, nausea, vomiting, diarrhea. Found to have COVID-19. Patient was tachycardic initially on arrival here. Patient was treated with IV fluids, meds for symptom relief. Vitals have improved, 123/79, temp 98.7, heart rate 74, respiratory rate 25, pulse ox 97% on room air. Patient was made aware of all findings. Plan will be for telemetry inpatient admission. Additional laboratory values ordered. Call out to sound physicians. Counseling:    The emergency provider has spoken with the patient and discussed todays results, in addition to providing specific details for the plan of care and counseling regarding the diagnosis and prognosis. Questions are answered at this time and they are agreeable with the plan.      --------------------------------- IMPRESSION AND DISPOSITION ---------------------------------    IMPRESSION  1. COVID-19    2. [facility]        DISPOSITION  Disposition:  Admission to [facility]  Patient condition is good      NOTE: This report was transcribed using voice recognition software.  Every effort was made to ensure accuracy; however, inadvertent computerized transcription errors may be present     MACK Shell - CNP  05/04/22 Eld[place] 3, MACK - CNP  05/04/22 [id number] Jose Garcia DO  05/04/22 [id number]

## 2022-05-04 NOTE — H&P
Hospitalist History & Physical      PCP: No primary care provider on file. Date of Admission: 5/4/2022    Date of Service: Pt seen/examined on 5/4/2022 and is admitted to Inpatient with expected LOS greater than two midnights due to medical therapy. Chief Complaint:  had concerns including Fever, Abdominal Pain, Cough (for 2 days), Diarrhea, Chills, and Nausea. History Of Present Illness:    Mr. oB Woods, a 33y.o. year old male  who  has a past medical history of Trichimoniasis. Patient was admitted to the hospital with subjective fever at home. He has been having dry cough for the past few days. His kids are sick with similar presentation. He denies diarrhea. He denies chest pain initially but currently had some chest discomfort. He denies shortness of breath. He denies dizziness or loss of consciousness. Denies change in taste or smell. His appetite remains stable. Past Medical History:        Diagnosis Date    Trichimoniasis        Past Surgical History:    History reviewed. No pertinent surgical history. Medications Prior to Admission:      Prior to Admission medications    Not on File       Allergies:  Patient has no known allergies. Social History:    TOBACCO:   reports that he has been smoking cigars. He has never used smokeless tobacco.  ETOH:   reports current alcohol use of about 3.0 standard drinks of alcohol per week. Family History:    Reviewed in detail and negative for DM, CAD, Cancer, CVA. Positive as follows\"  History reviewed. No pertinent family history. REVIEW OF SYSTEMS:   Pertinent positives as noted in the HPI. All other systems reviewed and negative. No dizziness no loss of consciousness. PHYSICAL EXAM:  /78   Pulse 76   Temp 96.7 °F (35.9 °C) (Oral)   Resp 16   Ht 5' 10\" (1.778 m)   Wt 190 lb (86.2 kg)   SpO2 99%   BMI 27.26 kg/m²   General appearance: No apparent distress, appears stated age and cooperative.   HEENT: Normal cephalic, atraumatic without obvious deformity. Pupils equal, round, and reactive to light. Extra ocular muscles intact. Conjunctivae/corneas clear. Neck: Supple, with full range of motion. No jugular venous distention. Trachea midline. Respiratory: Decreased breath sounds, no wheezing. Cardiovascular: S1-S2 normal, no rubs gallops or murmurs  Abdomen: Soft nontender nondistended positive bowel sounds  Musculoskeletal: No clubbing, cyanosis, no edema. Skin: Normal skin color. No rashes or lesions. Neurologic:  Neurovascularly intact without any focal sensory/motor deficits. Cranial nerves: II-XII intact, grossly non-focal.  Psychiatric: Alert and oriented, thought content appropriate, normal insight    Reviewed EKG and CXR personally    CBC:   Recent Labs     05/03/22 2032   WBC 5.0   RBC 5.41   HGB 17.0*   HCT 47.4   MCV 87.6   RDW 12.3   *     BMP:   Recent Labs     05/03/22 2032      K 3.6   CL 98   CO2 23   BUN 12   CREATININE 1.2     LFT:  Recent Labs     05/03/22 2032   PROT 7.6   ALKPHOS 63   ALT 31   AST 43*   BILITOT 0.3   LIPASE 64*     CE:  No results for input(s): Kaylee Comment in the last 72 hours. PT/INR: No results for input(s): INR, APTT in the last 72 hours. BNP: No results for input(s): BNP in the last 72 hours.   ESR: No results found for: SEDRATE  CRP:   Lab Results   Component Value Date    CRP 0.7 (H) 05/04/2022     D Dimer:   Lab Results   Component Value Date    DDIMER 210 05/04/2022      Folate and B12: No results found for: Courtney Lewis, No results found for: FOLATE  Lactic Acid: No results found for: LACTA  Thyroid Studies: No results found for: TSH, O0PBZLD, G0RJOEC, THYROIDAB    Oupatient labs:  No results found for: CHOL, TRIG, HDL, LDLCALC, TSH, PSA, INR, LABA1C    Urinalysis:    Lab Results   Component Value Date    NITRU Negative 05/03/2022    WBCUA 5-10 05/03/2022    BACTERIA FEW 05/03/2022    RBCUA 0-1 05/03/2022    BLOODU SMALL 05/03/2022    SPECGRAV >=1.030 05/03/2022    GLUCOSEU Negative 05/03/2022       Imaging:  CT ABDOMEN PELVIS W IV CONTRAST Additional Contrast? None    Result Date: 5/4/2022  EXAMINATION: CTA OF THE CHEST; CT OF THE ABDOMEN AND PELVIS WITH CONTRAST 5/4/2022 2:08 am TECHNIQUE: CTA of the chest was performed after the administration of intravenous contrast.  Multiplanar reformatted images are provided for review. MIP images are provided for review. Dose modulation, iterative reconstruction, and/or weight based adjustment of the mA/kV was utilized to reduce the radiation dose to as low as reasonably achievable.; CT of the abdomen and pelvis was performed with the administration of intravenous contrast. Multiplanar reformatted images are provided for review. Dose modulation, iterative reconstruction, and/or weight based adjustment of the mA/kV was utilized to reduce the radiation dose to as low as reasonably achievable. COMPARISON: None HISTORY: ORDERING SYSTEM PROVIDED HISTORY: r/o PE TECHNOLOGIST PROVIDED HISTORY: Reason for exam:->r/o PE Decision Support Exception - unselect if not a suspected or confirmed emergency medical condition->Emergency Medical Condition (MA) What reading provider will be dictating this exam?->CRC; ORDERING SYSTEM PROVIDED HISTORY: abd pain TECHNOLOGIST PROVIDED HISTORY: Reason for exam:->abd pain Additional Contrast?->None Decision Support Exception - unselect if not a suspected or confirmed emergency medical condition->Emergency Medical Condition (MA) What reading provider will be dictating this exam?->CRC FINDINGS: Chest: Mediastinum: There is no evidence of a central or lobar pulmonary embolus. Loss of opacification of the segmental branches of the lingula, best appreciated on series 6, image 159, which raise suspicion for small pulmonary emboli.   In addition, asymmetric wall thickening involving a branch of the left lower lobe segmental pulmonary artery on series 6, image 166, which may suggest more of a chronic PE with asymmetric wall thickening of the vessel. No evidence of a right-sided pulmonary embolus. No heart strain. There is no pericardial effusion. No hilar or mediastinal lymphadenopathy. No obstructing endobronchial process. The thoracic aorta is normal in course and caliber. Lungs/pleura: The lungs are clear. No mass, consolidation, effusion or pneumothorax. No nodules. Soft Tissues/Bones: No soft tissue or osseous abnormality. Abdomen/Pelvis: Organs: The liver, spleen, kidneys, adrenal glands and pancreas are normal. GI/Bowel: The small and large bowel, including the appendix, are normal in appearance. No bowel obstruction, free air or evidence of acute appendicitis. Minimal colonic diverticulosis at the sigmoid colon, without diverticulitis. Pelvis: The pelvic organs are normal.  No free fluid or lymphadenopathy. No mass. Peritoneum/Retroperitoneum: No mass, fluid collection or lymphadenopathy. Bones/Soft Tissues: No osseous, soft tissue or vascular abnormality. Lack of opacification of certain segmental branches of the lingula, raising suspicion for pulmonary embolus. No evidence of a central or lobar embolus. No heart strain. No acute intra-abdominal or pelvic findings. XR CHEST PORTABLE    Result Date: 5/3/2022  EXAMINATION: ONE XRAY VIEW OF THE CHEST 5/3/2022 7:46 pm COMPARISON: None. HISTORY: ORDERING SYSTEM PROVIDED HISTORY: cough TECHNOLOGIST PROVIDED HISTORY: Reason for exam:->cough What reading provider will be dictating this exam?->CRC FINDINGS: Adequate and symmetric aeration of the lungs. There are no formed consolidations, pleural effusions, or pneumothoraces. Trachea and central mainstem bronchi appear clear. The cardiomediastinal silhouette and pulmonary vascularity appear within normal limits. Osseous and thoracic soft tissue structures demonstrate no acute findings. No evidence of active cardiopulmonary pathology.      CTA CHEST W CONTRAST    Result Date: 5/4/2022  EXAMINATION: CTA OF THE CHEST; CT OF THE ABDOMEN AND PELVIS WITH CONTRAST 5/4/2022 2:08 am TECHNIQUE: CTA of the chest was performed after the administration of intravenous contrast.  Multiplanar reformatted images are provided for review. MIP images are provided for review. Dose modulation, iterative reconstruction, and/or weight based adjustment of the mA/kV was utilized to reduce the radiation dose to as low as reasonably achievable.; CT of the abdomen and pelvis was performed with the administration of intravenous contrast. Multiplanar reformatted images are provided for review. Dose modulation, iterative reconstruction, and/or weight based adjustment of the mA/kV was utilized to reduce the radiation dose to as low as reasonably achievable. COMPARISON: None HISTORY: ORDERING SYSTEM PROVIDED HISTORY: r/o PE TECHNOLOGIST PROVIDED HISTORY: Reason for exam:->r/o PE Decision Support Exception - unselect if not a suspected or confirmed emergency medical condition->Emergency Medical Condition (MA) What reading provider will be dictating this exam?->CRC; ORDERING SYSTEM PROVIDED HISTORY: abd pain TECHNOLOGIST PROVIDED HISTORY: Reason for exam:->abd pain Additional Contrast?->None Decision Support Exception - unselect if not a suspected or confirmed emergency medical condition->Emergency Medical Condition (MA) What reading provider will be dictating this exam?->CRC FINDINGS: Chest: Mediastinum: There is no evidence of a central or lobar pulmonary embolus. Loss of opacification of the segmental branches of the lingula, best appreciated on series 6, image 159, which raise suspicion for small pulmonary emboli. In addition, asymmetric wall thickening involving a branch of the left lower lobe segmental pulmonary artery on series 6, image 166, which may suggest more of a chronic PE with asymmetric wall thickening of the vessel. No evidence of a right-sided pulmonary embolus. No heart strain.  There is no pericardial effusion. No hilar or mediastinal lymphadenopathy. No obstructing endobronchial process. The thoracic aorta is normal in course and caliber. Lungs/pleura: The lungs are clear. No mass, consolidation, effusion or pneumothorax. No nodules. Soft Tissues/Bones: No soft tissue or osseous abnormality. Abdomen/Pelvis: Organs: The liver, spleen, kidneys, adrenal glands and pancreas are normal. GI/Bowel: The small and large bowel, including the appendix, are normal in appearance. No bowel obstruction, free air or evidence of acute appendicitis. Minimal colonic diverticulosis at the sigmoid colon, without diverticulitis. Pelvis: The pelvic organs are normal.  No free fluid or lymphadenopathy. No mass. Peritoneum/Retroperitoneum: No mass, fluid collection or lymphadenopathy. Bones/Soft Tissues: No osseous, soft tissue or vascular abnormality. Lack of opacification of certain segmental branches of the lingula, raising suspicion for pulmonary embolus. No evidence of a central or lobar embolus. No heart strain. No acute intra-abdominal or pelvic findings. ASSESSMENT:    Principal Problem:    Pulmonary embolism without acute cor pulmonale (HCC)  Resolved Problems:    * No resolved hospital problems. *    Assessment and plan:  1. Acute COVID-19 infection without hypoxia: Supportive care, monitor clinical progression. 2.  Acute pulmonary embolism, unknown chronicity without evidence of cor pulmonale: Continue Lovenox, pulmonology consultation. Switch to oral anticoagulation prior to discharge. Obtain 2D echo. As needed oxygen. 3.  Discomfort likely associated with COVID-19 and pulmonary embolism: Continue to check cardiac enzymes. Diet: ADULT DIET;  Regular  Code Status: Full Code    Surrogate decision maker confirmed with patient:  Primary Emergency Contact: Cindi Wolf, Home Phone: [phone]    DVT Prophylaxis: [x]Lovenox []Heparin []PCD [] [address]  []Encouraged ambulation    Disposition: [x]Med/Surg [] Intermediate [] ICU/CCU  Admit status: [] Observation [] Inpatient     +++++++++++++++++++++++++++++++++++++++++++++++++  Herber Valentino MD  [address]  +++++++++++++++++++++++++++++++++++++++++++++++++  NOTE: This report was transcribed using voice recognition software. Every effort was made to ensure accuracy; however, inadvertent computerized transcription errors may be present.

## 2022-05-05 VITALS
SYSTOLIC BLOOD PRESSURE: 101 MMHG | WEIGHT: 190 LBS | HEIGHT: 70 IN | TEMPERATURE: 97.1 F | HEART RATE: 56 BPM | DIASTOLIC BLOOD PRESSURE: 55 MMHG | OXYGEN SATURATION: 98 % | BODY MASS INDEX: 27.2 KG/M2 | RESPIRATION RATE: 16 BRPM

## 2022-05-05 LAB
ALBUMIN SERPL-MCNC: 4 G/DL (ref 3.5–5.2)
ALP BLD-CCNC: 51 U/L (ref 40–129)
ALT SERPL-CCNC: 27 U/L (ref 0–40)
ANION GAP SERPL CALCULATED.3IONS-SCNC: 9 MMOL/L (ref 7–16)
AST SERPL-CCNC: 36 U/L (ref 0–39)
ATYPICAL LYMPHOCYTE RELATIVE PERCENT: 5.2 % (ref 0–4)
BASOPHILS ABSOLUTE: 0 E9/L (ref 0–0.2)
BASOPHILS RELATIVE PERCENT: 0.7 % (ref 0–2)
BILIRUB SERPL-MCNC: 0.3 MG/DL (ref 0–1.2)
BUN BLDV-MCNC: 11 MG/DL (ref 6–20)
CALCIUM SERPL-MCNC: 8.7 MG/DL (ref 8.6–10.2)
CHLORIDE BLD-SCNC: 101 MMOL/L (ref 98–107)
CO2: 28 MMOL/L (ref 22–29)
CREAT SERPL-MCNC: 1.2 MG/DL (ref 0.7–1.2)
EOSINOPHILS ABSOLUTE: 0 E9/L (ref 0.05–0.5)
EOSINOPHILS RELATIVE PERCENT: 0.2 % (ref 0–6)
GFR AFRICAN AMERICAN: >60
GFR NON-AFRICAN AMERICAN: >60 ML/MIN/1.73
GLUCOSE BLD-MCNC: 75 MG/DL (ref 74–99)
HCT VFR BLD CALC: 46.8 % (ref 37–54)
HEMOGLOBIN: 16.2 G/DL (ref 12.5–16.5)
LYMPHOCYTES ABSOLUTE: 1.32 E9/L (ref 1.5–4)
LYMPHOCYTES RELATIVE PERCENT: 28.1 % (ref 20–42)
MCH RBC QN AUTO: 30.8 PG (ref 26–35)
MCHC RBC AUTO-ENTMCNC: 34.6 % (ref 32–34.5)
MCV RBC AUTO: 89 FL (ref 80–99.9)
MONOCYTES ABSOLUTE: 0.48 E9/L (ref 0.1–0.95)
MONOCYTES RELATIVE PERCENT: 12.3 % (ref 2–12)
NEUTROPHILS ABSOLUTE: 2.16 E9/L (ref 1.8–7.3)
NEUTROPHILS RELATIVE PERCENT: 54.4 % (ref 43–80)
PDW BLD-RTO: 12.1 FL (ref 11.5–15)
PLATELET # BLD: 110 E9/L (ref 130–450)
PMV BLD AUTO: 13.4 FL (ref 7–12)
POTASSIUM REFLEX MAGNESIUM: 4.2 MMOL/L (ref 3.5–5)
RBC # BLD: 5.26 E12/L (ref 3.8–5.8)
RBC # BLD: NORMAL 10*6/UL
SODIUM BLD-SCNC: 138 MMOL/L (ref 132–146)
TOTAL PROTEIN: 6.5 G/DL (ref 6.4–8.3)
VITAMIN D 25-HYDROXY: 8 NG/ML (ref 30–100)
WBC # BLD: 4 E9/L (ref 4.5–11.5)

## 2022-05-05 PROCEDURE — 85025 COMPLETE CBC W/AUTO DIFF WBC: CPT

## 2022-05-05 PROCEDURE — 80053 COMPREHEN METABOLIC PANEL: CPT

## 2022-05-05 PROCEDURE — 6360000002 HC RX W HCPCS: Performed by: INTERNAL MEDICINE

## 2022-05-05 PROCEDURE — 36415 COLL VENOUS BLD VENIPUNCTURE: CPT

## 2022-05-05 PROCEDURE — 82306 VITAMIN D 25 HYDROXY: CPT

## 2022-05-05 PROCEDURE — 2580000003 HC RX 258: Performed by: INTERNAL MEDICINE

## 2022-05-05 PROCEDURE — 6370000000 HC RX 637 (ALT 250 FOR IP): Performed by: INTERNAL MEDICINE

## 2022-05-05 RX ADMIN — ONDANSETRON 4 MG: 2 INJECTION INTRAMUSCULAR; INTRAVENOUS at 02:25

## 2022-05-05 RX ADMIN — ACETAMINOPHEN 650 MG: 325 TABLET ORAL at 02:25

## 2022-05-05 RX ADMIN — SODIUM CHLORIDE, PRESERVATIVE FREE 10 ML: 5 INJECTION INTRAVENOUS at 08:31

## 2022-05-05 RX ADMIN — ENOXAPARIN SODIUM 90 MG: 100 INJECTION SUBCUTANEOUS at 08:30

## 2022-05-05 ASSESSMENT — PAIN SCALES - GENERAL: PAINLEVEL_OUTOF10: 4

## 2022-05-05 NOTE — PLAN OF CARE
Problem: Discharge Planning  Goal: Discharge to home or other facility with appropriate resources  Outcome: Completed     Problem: Pain  Goal: Verbalizes/displays adequate comfort level or baseline comfort level  Outcome: Completed     Problem: ABCDS Injury Assessment  Goal: Absence of physical injury  Outcome: Completed

## 2022-05-05 NOTE — DISCHARGE INSTR - COC
Continuity of Care Form    Patient Name: Fannie Sky   :  1990  MRN:  [id number]    Admit date:  2022  Discharge date:  ***    Code Status Order: Full Code   Advance Directives:      Admitting Physician:  Vel Andrade MD  PCP: No primary care provider on file. Discharging Nurse: [facility] Unit/Room#: 7405/7405-A  Discharging Unit Phone Number: ***    Emergency Contact:   Extended Emergency Contact Information  Primary Emergency Contact: Marielle Muse  Address: [address] [place] [address] Phone: [phone]  Relation: Other   needed? No    Past Surgical History:  History reviewed. No pertinent surgical history. Immunization History: There is no immunization history on file for this patient.     Active Problems:  Patient Active Problem List   Diagnosis Code    Pulmonary embolism without acute cor pulmonale (HCC) I26.99    PE (pulmonary thromboembolism) (HCC) I26.99       Isolation/Infection:   Isolation            Droplet Plus  Droplet Plus          Patient Infection Status       Infection Onset Added Last Indicated Last Indicated By Review Planned Expiration Resolved Resolved By    COVID-19 22 COVID-19, Rapid 05/10/22 05/17/22      Resolved    COVID-19 (Rule Out) 22 COVID-19, Rapid (Ordered)   22 Rule-Out Test Resulted    COVID-19 (Rule Out) 20 COVID-19 Ambulatory (Ordered)   20 Rule-Out Test Resulted            Nurse Assessment:  Last Vital Signs: BP (!) 101/55   Pulse 56   Temp 97.1 °F (36.2 °C) (Infrared)   Resp 16   Ht 5' 10\" (1.778 m)   Wt 190 lb (86.2 kg)   SpO2 98%   BMI 27.26 kg/m²     Last documented pain score (0-10 scale): Pain Level: 4  Last Weight:   Wt Readings from Last 1 Encounters:   22 190 lb (86.2 kg)     Mental Status:  {IP PT MENTAL STATUS:}    IV Access:  [facility] IV ACCESS:628486483}    Nursing Mobility/ADLs:  Walking   {CHP DME EFMT:934611235}  Transfer  {CHP DME CYXK:975645601}  Bathing  {CHP DME BJES:141183072}  Dressing  {CHP DME JGXZ:589909139}  Toileting  {CHP DME WGHM:511018264}  Feeding  {CHP DME SDVV:727728336}  Med Admin  {P DME SCYX:749068507}  Med Delivery   { MCKENZIE MED Delivery:821001453}    Wound Care Documentation and Therapy:        Elimination:  Continence: Bowel: {YES / AA:71873}  Bladder: {YES / OU:07625}  Urinary Catheter: {Urinary Catheter:370520783}   Colostomy/Ileostomy/Ileal Conduit: {YES / MP:44889}       Date of Last BM: ***    Intake/Output Summary (Last 24 hours) at 2022 1024  Last data filed at 2022 1819  Gross per 24 hour   Intake 480 ml   Output --   Net 480 ml     I/O last 3 completed shifts:   In: 18 [P.O.:480]  Out: -     Safety Concerns:     [employer] Safety Concerns:941854478}    Impairments/Disabilities:      [employer] Impairments/Disabilities:434703050}    Nutrition Therapy:  Current Nutrition Therapy:   [employer] Diet List:605820704}    Routes of Feeding: {[facility] DME Other Feedings:419554801}  Liquids: {Slp liquid thickness:71332}  Daily Fluid Restriction: {P DME Yes amt example:741585382}  Last Modified Barium Swallow with Video (Video Swallowing Test): {Done Not Done TZNV:906013651}    Treatments at the Time of Hospital Discharge:   Respiratory Treatments: ***  Oxygen Therapy:  {Therapy; copd oxygen:17696}  Ventilator:    { CC Vent ZFGP:270715359}    Rehab Therapies: {THERAPEUTIC INTERVENTION:6801693572}  Weight Bearing Status/Restrictions: [employer] Weight Bearin}  Other Medical Equipment (for information only, NOT a DME order):  {EQUIPMENT:038537127}  Other Treatments: ***    Patient's personal belongings (please select all that are sent with patient):  {[facility] DME Belongings:792472750}    RN SIGNATURE:  {Esignature:706010093}    CASE MANAGEMENT/SOCIAL WORK SECTION    Inpatient Status Date: ***    Readmission Risk Assessment Score:  Readmission Risk Risk of Unplanned Readmission:  7           Discharging to Facility/ Agency   Name:   Address:  Phone:  Fax:    Dialysis Facility (if applicable)   Name:  Address:  Dialysis Schedule:  Phone:  Fax:    / signature: {Esignature:787187222}    PHYSICIAN SECTION    Prognosis: {Prognosis:4830737581}    Condition at Discharge: Nely Stewart Patient Condition:857028093}    Rehab Potential (if transferring to Rehab): {Prognosis:9794722342}    Recommended Labs or Other Treatments After Discharge: ***    Physician Certification: I certify the above information and transfer of Mekhi Clarke  is necessary for the continuing treatment of the diagnosis listed and that he requires {Admit to Appropriate Level of Care:56494} for {GREATER/LESS:900831357} 30 days.      Update Admission H&P: {CHP DME Changes in [facility]:781195661}    PHYSICIAN SIGNATURE:  Electronically signed by Ward Boyd MD on 5/5/22 at 10:24 AM EDT

## 2022-05-05 NOTE — CARE COORDINATION
Care Coordination  Spoke to the patient by phone as he is in covid isolation. The patient lives in a 2 story home 13 steps to enter. His bed and bath are on the second level. He has no dme as he was independent prior to admission. He has no primary care physician however an appointment was made for him with Dr Larry Rapp on May 16 @ 145 pm . Address is [address] [place] [address], [address] [place] [address]. The patient is aware of this appointment. His Pharmacy is [email or web address]. His plan is to return home today. His ride home is his significant other. He was was admitted due to a pulmonary embolis . He was started on eliquis. I gave him both eliquis coupon cards and explained them to the patient . The coupons are in the patients soft chart.

## 2022-05-05 NOTE — CONSULTS
Came to see patient, already discharged. however I was not notified. Will try to arrange outpatient consult ASAP.

## 2022-05-05 NOTE — DISCHARGE SUMMARY
Hospitalist Discharge Summary    Patient ID: Tevin Amaro   Patient : 1990  Patient's PCP: No primary care provider on file. Admit Date: 2022   Admitting Physician: Darron Balderrama MD    Discharge Date:  2022  Discharge Physician: Darron Balderrama MD   Discharge Condition: Stable  Discharge Disposition: Home    History of presenting illness:  Mr. Tevin Amaro, a 33y.o. year old male  who  has a past medical history of Trichimoniasis.      Patient was admitted to the hospital with subjective fever at home. He has been having dry cough for the past few days. His kids are sick with similar presentation. He denies diarrhea. He denies chest pain initially but currently had some chest discomfort. He denies shortness of breath. He denies dizziness or loss of consciousness. Denies change in taste or smell. His appetite remains stable. Hospital course in brief:  (Please refer to daily progress notes for a comprehensive review of the hospitalization by requesting medical records)    Patient was admitted to the hospital with fever  He was found to have acute COVID-19 infection without hypoxia. CTA consistent with pulmonary embolism with no evidence of cor pulmonale  2D echo obtained pending results  Patient remained hemodynamically stable and not requiring any oxygen. He was monitored in the hospital.  He feels much better. He will be discharged home to follow-up with PCP, to follow-up on 2D echo report. Patient will be discharged on Eliquis. Pulmonology consulted during patient stay. Consults:   IP CONSULT TO INTERNAL MEDICINE  IP CONSULT TO PULMONOLOGY    Discharge Diagnoses:    1. Acute COVID-19 infection without hypoxia     2. Acute pulmonary embolism, unknown chronicity without evidence of cor pulmonale      3.   Discomfort likely associated with COVID-19 and pulmonary embolism      Discharge Instructions / Follow up:    Continued appropriate risk factor modification of blood pressure, diabetes and serum lipids will remain essential to reducing risk of future atherosclerotic development    Activity: activity as tolerated    Significant labs:  CBC:   Recent Labs     05/03/22 2032 05/05/22  0650   WBC 5.0 4.0*   RBC 5.41 5.26   HGB 17.0* 16.2   HCT 47.4 46.8   MCV 87.6 89.0   RDW 12.3 12.1   * 110*     BMP:   Recent Labs     05/03/22 2032 05/05/22  0650    138   K 3.6 4.2   CL 98 101   CO2 23 28   BUN 12 11   CREATININE 1.2 1.2     LFT:  Recent Labs     05/03/22 2032 05/05/22  0650   PROT 7.6 6.5   ALKPHOS 63 51   ALT 31 27   AST 43* 36   BILITOT 0.3 0.3   LIPASE 64*  --      PT/INR: No results for input(s): INR, APTT in the last 72 hours. BNP: No results for input(s): BNP in the last 72 hours. Hgb A1C: No results found for: LABA1C  Folate and B12: No results found for: FPQWAPSI54, No results found for: FOLATE  Thyroid Studies: No results found for: TSH, Z1QFARF, P8PIMIS, THYROIDAB    Urinalysis:    Lab Results   Component Value Date    NITRU Negative 05/03/2022    WBCUA 5-10 05/03/2022    BACTERIA FEW 05/03/2022    RBCUA 0-1 05/03/2022    BLOODU SMALL 05/03/2022    SPECGRAV >=1.030 05/03/2022    GLUCOSEU Negative 05/03/2022       Imaging:  CT ABDOMEN PELVIS W IV CONTRAST Additional Contrast? None    Result Date: 5/4/2022  EXAMINATION: CTA OF THE CHEST; CT OF THE ABDOMEN AND PELVIS WITH CONTRAST 5/4/2022 2:08 am TECHNIQUE: CTA of the chest was performed after the administration of intravenous contrast.  Multiplanar reformatted images are provided for review. MIP images are provided for review. Dose modulation, iterative reconstruction, and/or weight based adjustment of the mA/kV was utilized to reduce the radiation dose to as low as reasonably achievable.; CT of the abdomen and pelvis was performed with the administration of intravenous contrast. Multiplanar reformatted images are provided for review.  Dose modulation, iterative reconstruction, and/or weight based adjustment of the mA/kV was utilized to reduce the radiation dose to as low as reasonably achievable. COMPARISON: None HISTORY: ORDERING SYSTEM PROVIDED HISTORY: r/o PE TECHNOLOGIST PROVIDED HISTORY: Reason for exam:->r/o PE Decision Support Exception - unselect if not a suspected or confirmed emergency medical condition->Emergency Medical Condition (MA) What reading provider will be dictating this exam?->CRC; ORDERING SYSTEM PROVIDED HISTORY: abd pain TECHNOLOGIST PROVIDED HISTORY: Reason for exam:->abd pain Additional Contrast?->None Decision Support Exception - unselect if not a suspected or confirmed emergency medical condition->Emergency Medical Condition (MA) What reading provider will be dictating this exam?->CRC FINDINGS: Chest: Mediastinum: There is no evidence of a central or lobar pulmonary embolus. Loss of opacification of the segmental branches of the lingula, best appreciated on series 6, image 159, which raise suspicion for small pulmonary emboli. In addition, asymmetric wall thickening involving a branch of the left lower lobe segmental pulmonary artery on series 6, image 166, which may suggest more of a chronic PE with asymmetric wall thickening of the vessel. No evidence of a right-sided pulmonary embolus. No heart strain. There is no pericardial effusion. No hilar or mediastinal lymphadenopathy. No obstructing endobronchial process. The thoracic aorta is normal in course and caliber. Lungs/pleura: The lungs are clear. No mass, consolidation, effusion or pneumothorax. No nodules. Soft Tissues/Bones: No soft tissue or osseous abnormality. Abdomen/Pelvis: Organs: The liver, spleen, kidneys, adrenal glands and pancreas are normal. GI/Bowel: The small and large bowel, including the appendix, are normal in appearance. No bowel obstruction, free air or evidence of acute appendicitis. Minimal colonic diverticulosis at the sigmoid colon, without diverticulitis. Pelvis:  The pelvic organs are normal.  No free fluid or lymphadenopathy. No mass. Peritoneum/Retroperitoneum: No mass, fluid collection or lymphadenopathy. Bones/Soft Tissues: No osseous, soft tissue or vascular abnormality. Lack of opacification of certain segmental branches of the lingula, raising suspicion for pulmonary embolus. No evidence of a central or lobar embolus. No heart strain. No acute intra-abdominal or pelvic findings. XR CHEST PORTABLE    Result Date: 5/3/2022  EXAMINATION: ONE XRAY VIEW OF THE CHEST 5/3/2022 7:46 pm COMPARISON: None. HISTORY: ORDERING SYSTEM PROVIDED HISTORY: cough TECHNOLOGIST PROVIDED HISTORY: Reason for exam:->cough What reading provider will be dictating this exam?->CRC FINDINGS: Adequate and symmetric aeration of the lungs. There are no formed consolidations, pleural effusions, or pneumothoraces. Trachea and central mainstem bronchi appear clear. The cardiomediastinal silhouette and pulmonary vascularity appear within normal limits. Osseous and thoracic soft tissue structures demonstrate no acute findings. No evidence of active cardiopulmonary pathology. CTA CHEST W CONTRAST    Result Date: 5/4/2022  EXAMINATION: CTA OF THE CHEST; CT OF THE ABDOMEN AND PELVIS WITH CONTRAST 5/4/2022 2:08 am TECHNIQUE: CTA of the chest was performed after the administration of intravenous contrast.  Multiplanar reformatted images are provided for review. MIP images are provided for review. Dose modulation, iterative reconstruction, and/or weight based adjustment of the mA/kV was utilized to reduce the radiation dose to as low as reasonably achievable.; CT of the abdomen and pelvis was performed with the administration of intravenous contrast. Multiplanar reformatted images are provided for review. Dose modulation, iterative reconstruction, and/or weight based adjustment of the mA/kV was utilized to reduce the radiation dose to as low as reasonably achievable.  COMPARISON: None HISTORY: ORDERING SYSTEM PROVIDED HISTORY: r/o PE TECHNOLOGIST PROVIDED HISTORY: Reason for exam:->r/o PE Decision Support Exception - unselect if not a suspected or confirmed emergency medical condition->Emergency Medical Condition (MA) What reading provider will be dictating this exam?->CRC; ORDERING SYSTEM PROVIDED HISTORY: abd pain TECHNOLOGIST PROVIDED HISTORY: Reason for exam:->abd pain Additional Contrast?->None Decision Support Exception - unselect if not a suspected or confirmed emergency medical condition->Emergency Medical Condition (MA) What reading provider will be dictating this exam?->CRC FINDINGS: Chest: Mediastinum: There is no evidence of a central or lobar pulmonary embolus. Loss of opacification of the segmental branches of the lingula, best appreciated on series 6, image 159, which raise suspicion for small pulmonary emboli. In addition, asymmetric wall thickening involving a branch of the left lower lobe segmental pulmonary artery on series 6, image 166, which may suggest more of a chronic PE with asymmetric wall thickening of the vessel. No evidence of a right-sided pulmonary embolus. No heart strain. There is no pericardial effusion. No hilar or mediastinal lymphadenopathy. No obstructing endobronchial process. The thoracic aorta is normal in course and caliber. Lungs/pleura: The lungs are clear. No mass, consolidation, effusion or pneumothorax. No nodules. Soft Tissues/Bones: No soft tissue or osseous abnormality. Abdomen/Pelvis: Organs: The liver, spleen, kidneys, adrenal glands and pancreas are normal. GI/Bowel: The small and large bowel, including the appendix, are normal in appearance. No bowel obstruction, free air or evidence of acute appendicitis. Minimal colonic diverticulosis at the sigmoid colon, without diverticulitis. Pelvis: The pelvic organs are normal.  No free fluid or lymphadenopathy. No mass. Peritoneum/Retroperitoneum: No mass, fluid collection or lymphadenopathy.  Bones/Soft Tissues: No osseous, soft tissue or vascular abnormality. Lack of opacification of certain segmental branches of the lingula, raising suspicion for pulmonary embolus. No evidence of a central or lobar embolus. No heart strain. No acute intra-abdominal or pelvic findings. Discharge Medications:      Medication List      START taking these medications    apixaban 5 MG Tabs tablet  Commonly known as: Eliquis  Take 2 tablets by mouth 2 times daily for 7 days           Where to Get Your Medications      These medications were sent to [facility], [address] [facility] [address]    Phone: [phone]   · apixaban 5 MG Tabs tablet         Time Spent on discharge is more than 35 minutes in the examination, evaluation, counseling and review of medications and discharge plan.    +++++++++++++++++++++++++++++++++++++++++++++++++  Imani Iqbal MD  [address]  +++++++++++++++++++++++++++++++++++++++++++++++++  NOTE: This report was transcribed using voice recognition software. Every effort was made to ensure accuracy; however, inadvertent computerized transcription errors may be present.

## 2022-05-05 NOTE — DISCHARGE INSTR - DIET

## 2022-05-05 NOTE — FACE TO FACE
Hospital Stay     Patient's Name: Jase Edmondson    YOB: 1990    Primary Diagnosis: COVID-19 , PE     Date of Face to Face:   05/05/2022                                 This patient was under my care during his hospital stay. He was admitted through the emergency room 5/3/2022 and was discharged 5/5/2022.     Patient could go to work 5/09/2022      Shanta Lopez MD  5/5/2022

## 2022-05-05 NOTE — PROGRESS NOTES
CLINICAL PHARMACY NOTE: MEDS TO BEDS    Total # of Prescriptions Filled: 1   The following medications were delivered to the patient:  · ELIQUIS STARTER PACK    Additional [place]ation[place]  [facility] [place]

## 2022-05-06 ENCOUNTER — CARE COORDINATION (OUTPATIENT)
Dept: CARE COORDINATION | Age: 32
End: 2022-05-06

## 2022-05-06 LAB — URINE CULTURE, ROUTINE: NORMAL

## 2022-05-06 NOTE — CARE COORDINATION
Sheri [facility] Transitions Initial Follow Up Call    Call within 2 business days of discharge: Yes    Patient: Tevin Amaro Patient : 1990   MRN: <[id number]>  Reason for Admission: -22 Covid-19; PE  Discharge Date: 22 RARS: Readmission Risk Score: 5.6 ( )      Last Discharge [facility]       Complaint Diagnosis Description Type Department Provider    22 Fever; Abdominal Pain; Cough; Diarrhea; Chills; Nausea COVID-19 . .. ED to Hosp-Admission (Discharged) (ADMITTED) [address] Darron Balderrama MD; Yuniel Celeste. .. Spoke with: Patient    Kristyn Schmidt reports fatigue, moist cough, nausea, fatigue. Discussed Covid Zone Tool Education. Pt v/u. CTN will send Covid Zone Tool to Pt via [employer] message. Pt aware. Pt states appetite is good and is drinking plenty of fluids. Medication Review completed. Reviewed Bleeding Precautions while on anticoagulant for PE. Pt v/u. Pt has New Pt Appt scheduled with Dr Estephania Swartz. Pt aware. Pt denies Transportation, Home, or Medication needs. CTN information given. Facility: [facility]    Patient contacted regarding COVID-19 diagnosis. Discussed COVID-19 related testing which was available at this time. Test results were positive. Patient informed of results, if available? Yes. Care Transition Nurse contacted the patient by telephone to perform post discharge assessment. Call within 2 business days of discharge: Yes. Verified name and  with patient as identifiers. Provided introduction to self, and explanation of the CTN/ACM role, and reason for call due to risk factors for infection and/or exposure to COVID-19. Symptoms reviewed with patient who verbalized the following symptoms: fatigue  cough  nausea  no new symptoms  no worsening symptoms. Due to no new or worsening symptoms encounter was not routed to provider for escalation. Discussed follow-up appointments.  If no appointment was previously scheduled, appointment scheduling offered: Yes.  [facility] follow up appointment(s):   Future Appointments   Date Time Provider Raj Price   5/16/2022  1:45 PM DO HARVEY Rojas [facility]     Non-[facility] follow up appointment(s):     Non-face-to-face services provided:  Obtained and reviewed discharge summary and/or continuity of care documents     Advance Care Planning:   Does patient have an Advance Directive:  not on file. Educated patient about risk for severe COVID-19 due to risk factors according to CDC guidelines. CTN reviewed discharge instructions, medical action plan and red flag symptoms with the patient who verbalized understanding. Discussed COVID vaccination status: Yes. Education provided on COVID-19 vaccination as appropriate. Discussed exposure protocols and quarantine with CDC Guidelines. Patient was given an opportunity to verbalize any questions and concerns and agrees to contact CTN or health care provider for questions related to their healthcare. Reviewed and educated patient on any new and changed medications related to discharge diagnosis     Was patient discharged with a pulse oximeter? No Discussed and confirmed pulse oximeter discharge instructions and when to notify provider or seek emergency care. CTN provided contact information. No further follow-up call identified based on severity of symptoms and risk factors.   Care Transitions 24 Hour Call    Schedule Follow Up Appointment with PCP: Completed  Do you have a copy of your discharge instructions?: Yes  Do you have all of your prescriptions and are they filled?: Yes  Have you been contacted by a [address]?: No  Have you scheduled your follow up appointment?: Yes  How are you going to get to your appointment?: Car - drive self  Do you feel like you have everything you need to keep you well at home?: Yes  Care Transitions Interventions  No Identified Needs   Meds to Bed: Completed            Follow Up  Future Appointments   Date Time Provider Raj Price   5/16/2022  1:45 PM Maynor Rodgers, DO [facility], LPN

## 2022-09-27 ENCOUNTER — HOSPITAL ENCOUNTER (EMERGENCY)
Age: 32
Discharge: HOME | End: 2022-09-27

## 2022-09-27 ENCOUNTER — APPOINTMENT (OUTPATIENT)
Dept: GENERAL RADIOLOGY | Age: 32
End: 2022-09-27

## 2022-09-27 VITALS
RESPIRATION RATE: 18 BRPM | WEIGHT: 185 LBS | HEIGHT: 71 IN | HEART RATE: 77 BPM | OXYGEN SATURATION: 99 % | SYSTOLIC BLOOD PRESSURE: 113 MMHG | TEMPERATURE: 98.1 F | BODY MASS INDEX: 25.9 KG/M2 | DIASTOLIC BLOOD PRESSURE: 68 MMHG

## 2022-09-27 DIAGNOSIS — R11.10 POST-TUSSIVE EMESIS: ICD-10-CM

## 2022-09-27 DIAGNOSIS — J06.9 VIRAL URI WITH COUGH: Primary | ICD-10-CM

## 2022-09-27 LAB — SARS-COV-2, NAAT: NOT DETECTED

## 2022-09-27 PROCEDURE — 6370000000 HC RX 637 (ALT 250 FOR IP): Performed by: NURSE PRACTITIONER

## 2022-09-27 PROCEDURE — 99284 EMERGENCY DEPT VISIT MOD MDM: CPT

## 2022-09-27 PROCEDURE — 71046 X-RAY EXAM CHEST 2 VIEWS: CPT

## 2022-09-27 PROCEDURE — 87635 SARS-COV-2 COVID-19 AMP PRB: CPT

## 2022-09-27 RX ORDER — GUAIFENESIN/DEXTROMETHORPHAN 100-10MG/5
10 SYRUP ORAL ONCE
Status: COMPLETED | OUTPATIENT
Start: 2022-09-27 | End: 2022-09-27

## 2022-09-27 RX ORDER — GUAIFENESIN/DEXTROMETHORPHAN 100-10MG/5
10 SYRUP ORAL 3 TIMES DAILY PRN
Qty: 120 ML | Refills: 0 | Status: SHIPPED | OUTPATIENT
Start: 2022-09-27 | End: 2022-10-07

## 2022-09-27 RX ORDER — ONDANSETRON 4 MG/1
4 TABLET, ORALLY DISINTEGRATING ORAL ONCE
Status: COMPLETED | OUTPATIENT
Start: 2022-09-27 | End: 2022-09-27

## 2022-09-27 RX ORDER — ACETAMINOPHEN 500 MG
1000 TABLET ORAL EVERY 6 HOURS PRN
Qty: 120 TABLET | Refills: 0 | Status: SHIPPED | OUTPATIENT
Start: 2022-09-27

## 2022-09-27 RX ORDER — FLUTICASONE PROPIONATE 50 MCG
1 SPRAY, SUSPENSION (ML) NASAL DAILY
Qty: 16 G | Refills: 0 | Status: SHIPPED | OUTPATIENT
Start: 2022-09-27

## 2022-09-27 RX ADMIN — ONDANSETRON 4 MG: 4 TABLET, ORALLY DISINTEGRATING ORAL at 22:28

## 2022-09-27 RX ADMIN — GUAIFENESIN SYRUP AND DEXTROMETHORPHAN 10 ML: 100; 10 SYRUP ORAL at 22:28

## 2022-09-27 ASSESSMENT — PAIN - FUNCTIONAL ASSESSMENT: PAIN_FUNCTIONAL_ASSESSMENT: NONE - DENIES PAIN

## 2022-09-27 NOTE — Clinical Note
Ramona Potter was seen and treated in our emergency department on 9/27/2022. He may return to work on 09/29/2022. As tolerate     If you have any questions or concerns, please don't hesitate to call.       Timothy Solis, APRN - CNP

## 2023-09-12 ENCOUNTER — HOSPITAL ENCOUNTER (EMERGENCY)
Age: 33
Discharge: HOME | End: 2023-09-12

## 2023-09-12 ENCOUNTER — APPOINTMENT (OUTPATIENT)
Dept: GENERAL RADIOLOGY | Age: 33
End: 2023-09-12

## 2023-09-12 VITALS
WEIGHT: 165 LBS | OXYGEN SATURATION: 99 % | HEART RATE: 70 BPM | TEMPERATURE: 97.1 F | DIASTOLIC BLOOD PRESSURE: 70 MMHG | SYSTOLIC BLOOD PRESSURE: 123 MMHG | RESPIRATION RATE: 18 BRPM | BODY MASS INDEX: 23.01 KG/M2

## 2023-09-12 DIAGNOSIS — J06.9 VIRAL URI WITH COUGH: Primary | ICD-10-CM

## 2023-09-12 LAB
SARS-COV-2 RDRP RESP QL NAA+PROBE: NOT DETECTED
SPECIMEN DESCRIPTION: NORMAL
SPECIMEN SOURCE: NORMAL
STREP A, MOLECULAR: NEGATIVE

## 2023-09-12 PROCEDURE — 99284 EMERGENCY DEPT VISIT MOD MDM: CPT

## 2023-09-12 PROCEDURE — 71046 X-RAY EXAM CHEST 2 VIEWS: CPT

## 2023-09-12 PROCEDURE — 87635 SARS-COV-2 COVID-19 AMP PRB: CPT

## 2023-09-12 RX ORDER — BENZONATATE 100 MG/1
100 CAPSULE ORAL 3 TIMES DAILY PRN
Qty: 30 CAPSULE | Refills: 0 | Status: SHIPPED | OUTPATIENT
Start: 2023-09-12 | End: 2023-09-22

## 2023-09-12 RX ORDER — ALBUTEROL SULFATE 90 UG/1
2 AEROSOL, METERED RESPIRATORY (INHALATION) 4 TIMES DAILY PRN
Qty: 18 G | Refills: 0 | Status: SHIPPED | OUTPATIENT
Start: 2023-09-12

## 2023-09-12 ASSESSMENT — LIFESTYLE VARIABLES
HOW MANY STANDARD DRINKS CONTAINING ALCOHOL DO YOU HAVE ON A TYPICAL DAY: PATIENT DOES NOT DRINK
HOW OFTEN DO YOU HAVE A DRINK CONTAINING ALCOHOL: NEVER

## 2023-09-12 ASSESSMENT — PAIN - FUNCTIONAL ASSESSMENT: PAIN_FUNCTIONAL_ASSESSMENT: NONE - DENIES PAIN

## 2023-09-12 NOTE — ED PROVIDER NOTES
COVID-19, viral illness, pneumonia. Patient is in no acute distress, afebrile, nontoxic appearance. Patient's COVID and strep are negative. Patient chest x-ray is negative. Patient be sent with albuterol and Tessalon recommend follow-up with PCP. Recommended patient return to the ED with new or worsening symptoms. I am the Primary Clinician of Record. Assessment     1. Viral URI with cough      Plan   Discharged home. Patient condition is stable    New Medications     New Prescriptions    ALBUTEROL SULFATE HFA (VENTOLIN HFA) 108 (90 BASE) MCG/ACT INHALER    Inhale 2 puffs into the lungs 4 times daily as needed for Wheezing    BENZONATATE (TESSALON) 100 MG CAPSULE    Take 1 capsule by mouth 3 times daily as needed for Cough     Electronically signed by Cayla Pierce PA-C   DD: 9/12/23  **This report was transcribed using voice recognition software. Every effort was made to ensure accuracy; however, inadvertent computerized transcription errors may be present.   END OF ED PROVIDER NOTE        Cayla Pierce PA-C  09/12/23 112

## 2024-03-31 ENCOUNTER — HOSPITAL ENCOUNTER (EMERGENCY)
Age: 34
Discharge: HOME | End: 2024-03-31
Attending: EMERGENCY MEDICINE

## 2024-03-31 VITALS
TEMPERATURE: 97.8 F | OXYGEN SATURATION: 96 % | RESPIRATION RATE: 22 BRPM | SYSTOLIC BLOOD PRESSURE: 113 MMHG | WEIGHT: 145 LBS | BODY MASS INDEX: 20.76 KG/M2 | HEIGHT: 70 IN | DIASTOLIC BLOOD PRESSURE: 75 MMHG | HEART RATE: 79 BPM

## 2024-03-31 DIAGNOSIS — F10.920 ACUTE ALCOHOLIC INTOXICATION WITHOUT COMPLICATION (HCC): Primary | ICD-10-CM

## 2024-03-31 DIAGNOSIS — F12.90 CANNABIS USE, UNCOMPLICATED: ICD-10-CM

## 2024-03-31 LAB
ALBUMIN SERPL-MCNC: 4.7 G/DL (ref 3.5–5.2)
ALP SERPL-CCNC: 51 U/L (ref 40–129)
ALT SERPL-CCNC: 20 U/L (ref 0–40)
AMPHET UR QL SCN: POSITIVE
ANION GAP SERPL CALCULATED.3IONS-SCNC: 17 MMOL/L (ref 7–16)
APAP SERPL-MCNC: <5 UG/ML (ref 10–30)
AST SERPL-CCNC: 30 U/L (ref 0–39)
BARBITURATES UR QL SCN: NEGATIVE
BASOPHILS # BLD: 0.03 K/UL (ref 0–0.2)
BASOPHILS NFR BLD: 0 % (ref 0–2)
BENZODIAZ UR QL: NEGATIVE
BILIRUB SERPL-MCNC: 0.5 MG/DL (ref 0–1.2)
BUN SERPL-MCNC: 9 MG/DL (ref 6–20)
BUPRENORPHINE UR QL: NEGATIVE
CALCIUM SERPL-MCNC: 9.3 MG/DL (ref 8.6–10.2)
CANNABINOIDS UR QL SCN: POSITIVE
CHLORIDE SERPL-SCNC: 100 MMOL/L (ref 98–107)
CO2 SERPL-SCNC: 21 MMOL/L (ref 22–29)
COCAINE UR QL SCN: NEGATIVE
CREAT SERPL-MCNC: 1 MG/DL (ref 0.7–1.2)
EOSINOPHIL # BLD: 0.01 K/UL (ref 0.05–0.5)
EOSINOPHILS RELATIVE PERCENT: 0 % (ref 0–6)
ERYTHROCYTE [DISTWIDTH] IN BLOOD BY AUTOMATED COUNT: 12.6 % (ref 11.5–15)
ETHANOLAMINE SERPL-MCNC: 222 MG/DL
FENTANYL UR QL: NEGATIVE
GFR SERPL CREATININE-BSD FRML MDRD: >90 ML/MIN/1.73M2
GLUCOSE SERPL-MCNC: 98 MG/DL (ref 74–99)
HCT VFR BLD AUTO: 44.1 % (ref 37–54)
HGB BLD-MCNC: 15.3 G/DL (ref 12.5–16.5)
IMM GRANULOCYTES # BLD AUTO: 0.04 K/UL (ref 0–0.58)
IMM GRANULOCYTES NFR BLD: 0 % (ref 0–5)
LYMPHOCYTES NFR BLD: 1.87 K/UL (ref 1.5–4)
LYMPHOCYTES RELATIVE PERCENT: 17 % (ref 20–42)
MCH RBC QN AUTO: 31.7 PG (ref 26–35)
MCHC RBC AUTO-ENTMCNC: 34.7 G/DL (ref 32–34.5)
MCV RBC AUTO: 91.5 FL (ref 80–99.9)
METHADONE UR QL: NEGATIVE
MONOCYTES NFR BLD: 0.42 K/UL (ref 0.1–0.95)
MONOCYTES NFR BLD: 4 % (ref 2–12)
NEUTROPHILS NFR BLD: 78 % (ref 43–80)
NEUTS SEG NFR BLD: 8.65 K/UL (ref 1.8–7.3)
OPIATES UR QL SCN: NEGATIVE
OXYCODONE UR QL SCN: NEGATIVE
PCP UR QL SCN: NEGATIVE
PLATELET, FLUORESCENCE: 148 K/UL (ref 130–450)
PMV BLD AUTO: 11.7 FL (ref 7–12)
POTASSIUM SERPL-SCNC: 3.5 MMOL/L (ref 3.5–5)
PROT SERPL-MCNC: 7.7 G/DL (ref 6.4–8.3)
RBC # BLD AUTO: 4.82 M/UL (ref 3.8–5.8)
SALICYLATES SERPL-MCNC: <0.3 MG/DL (ref 0–30)
SODIUM SERPL-SCNC: 138 MMOL/L (ref 132–146)
TEST INFORMATION: ABNORMAL
TOXIC TRICYCLIC SC,BLOOD: NEGATIVE
WBC OTHER # BLD: 11 K/UL (ref 4.5–11.5)

## 2024-03-31 PROCEDURE — 80179 DRUG ASSAY SALICYLATE: CPT

## 2024-03-31 PROCEDURE — G0480 DRUG TEST DEF 1-7 CLASSES: HCPCS

## 2024-03-31 PROCEDURE — 99284 EMERGENCY DEPT VISIT MOD MDM: CPT

## 2024-03-31 PROCEDURE — 80053 COMPREHEN METABOLIC PANEL: CPT

## 2024-03-31 PROCEDURE — 85025 COMPLETE CBC W/AUTO DIFF WBC: CPT

## 2024-03-31 PROCEDURE — 6370000000 HC RX 637 (ALT 250 FOR IP)

## 2024-03-31 PROCEDURE — 80307 DRUG TEST PRSMV CHEM ANLYZR: CPT

## 2024-03-31 PROCEDURE — 80143 DRUG ASSAY ACETAMINOPHEN: CPT

## 2024-03-31 PROCEDURE — 2580000003 HC RX 258

## 2024-03-31 RX ORDER — ACETAMINOPHEN 500 MG
500 TABLET ORAL ONCE
Status: COMPLETED | OUTPATIENT
Start: 2024-03-31 | End: 2024-03-31

## 2024-03-31 RX ORDER — 0.9 % SODIUM CHLORIDE 0.9 %
1000 INTRAVENOUS SOLUTION INTRAVENOUS ONCE
Status: COMPLETED | OUTPATIENT
Start: 2024-03-31 | End: 2024-03-31

## 2024-03-31 RX ADMIN — ACETAMINOPHEN 500 MG: 500 TABLET ORAL at 09:03

## 2024-03-31 RX ADMIN — SODIUM CHLORIDE 1000 ML: 9 INJECTION, SOLUTION INTRAVENOUS at 08:02

## 2024-03-31 ASSESSMENT — PAIN DESCRIPTION - LOCATION: LOCATION: HEAD

## 2024-03-31 ASSESSMENT — PAIN - FUNCTIONAL ASSESSMENT
PAIN_FUNCTIONAL_ASSESSMENT: NONE - DENIES PAIN
PAIN_FUNCTIONAL_ASSESSMENT: NONE - DENIES PAIN

## 2024-03-31 ASSESSMENT — PAIN SCALES - GENERAL: PAINLEVEL_OUTOF10: 10

## 2024-03-31 ASSESSMENT — PAIN DESCRIPTION - DESCRIPTORS: DESCRIPTORS: ACHING

## 2024-03-31 NOTE — DISCHARGE INSTR - COC
Continuity of Care Form    Patient Name: Kojo Hinds   :  1990  MRN:  [id number]    Admit date:  3/31/2024  Discharge date:  ***    Code Status Order: Prior   Advance Directives:     Admitting Physician:  No admitting provider for patient encounter.  PCP: No primary care provider on file.    Discharging Nurse: ***  Discharging Hospital Unit/Room#:   Discharging Unit Phone Number: ***    Emergency Contact:   Extended Emergency Contact Information  Primary Emergency Contact: Marielle Muse  Address: [address] [place] [address]  Home Phone: [phone]  Relation: Other   needed? No    Past Surgical History:  History reviewed. No pertinent surgical history.    Immunization History:     There is no immunization history on file for this patient.    Active Problems:  Patient Active Problem List   Diagnosis Code    Pulmonary embolism without acute cor pulmonale (HCC) I26.99    PE (pulmonary thromboembolism) ([facility]) I26.99       Isolation/Infection:   Isolation            No Isolation          Patient Infection Status       Infection Onset Added Last Indicated Last Indicated By Review Planned Expiration Resolved Resolved By    None active    Resolved    COVID-19 22 COVID-19, Rapid   22 Infection                        Nurse Assessment:  Last Vital Signs: /75   Pulse 79   Temp 97.8 °F (36.6 °C) (Oral)   Resp 22   Ht 1.778 m (5' 10\")   Wt 65.8 kg (145 lb)   SpO2 96%   BMI 20.81 kg/m²     Last documented pain score (0-10 scale): Pain Level: 10  Last Weight:   Wt Readings from Last 1 Encounters:   24 65.8 kg (145 lb)     Mental Status:  {IP PT MENTAL STATUS:}    IV Access:  { MCKENZIE IV ACCESS:994774887}    Nursing Mobility/ADLs:  Walking   {CHP DME ADLs:433149374}  Transfer  {CHP DME ADLs:859123424}  Bathing  {CHP DME ADLs:501706031}  Dressing  {CHP DME ADLs:380932409}  Toileting  {CHP DME

## 2024-03-31 NOTE — ED PROVIDER NOTES
ATTENDING PROVIDER ATTESTATION:     Kojo Hinds presented to the emergency department for evaluation of Alcohol Intoxication (Drinking since midnight then took ectasy. Girlfriend called EMS stating he wasn't acting right and \"flopping on the floor.\" Per girlfriend pt was spitting up blood. Cut to upper mouth near teeth. Denies SI)   and was initially evaluated by the Medical Resident. See Original ED Note for H&P and ED course above.     I have reviewed and discussed the case, including pertinent history (medical, surgical, family and social) and exam findings with the Medical Resident assigned to Kojo Hinds.  I have personally performed and/or participated in the history, exam, medical decision making, and procedures and agree with all pertinent clinical information and any additional changes or corrections are noted below in my assessment and plan. I have discussed this patient in detail with the resident, and provided the instruction and education,       I have reviewed my findings and recommendations with the assigned Medical Resident, Kojo Hinds and members of family present at the time of disposition.      I have performed a history and physical examination of this patient and directly supervised the resident caring for this patient              [facility] EMERGENCY DEPARTMENT  EMERGENCY DEPARTMENT ENCOUNTER        Pt Name: Kojo Hinds  MRN: [id number]  Birthdate 1990  Date of evaluation: 3/31/2024  Provider: Lew Albarran MD  PCP: No primary care provider on file.  Note Started: 7:32 AM EDT 3/31/24    CHIEF COMPLAINT       Chief Complaint   Patient presents with    Alcohol Intoxication     Drinking since midnight then took ectasy. Girlfriend called EMS stating he wasn't acting right and \"flopping on the floor.\" Per girlfriend pt was spitting up blood. Cut to upper mouth near teeth. Denies SI       HISTORY OF PRESENT ILLNESS: 1 or more Elements     Limitations to history 
are now.  The significant other is likely ongoing being the patient's sober ride.  At this time, the patient feels well, no acute distress and has no complaints. [KS]      ED Course User Index  [CD] Lew Albarran MD  [KS] Srinivasa Alvarado MD        Treatment here in the ED:   Medications   sodium chloride 0.9 % bolus 1,000 mL (0 mLs IntraVENous Stopped 3/31/24 1144)   acetaminophen (TYLENOL) tablet 500 mg (500 mg Oral Given 3/31/24 0903)         Discussion: Patient presents for altered mental status/acute alcohol intoxication and possible drug use.  Patient's urine drug screen is positive for amphetamines and cannabis, ethanol level 222.  Otherwise unconcerning labs.  Patient treated with fluids and was given Tylenol at his request.  On reassessment, patient is feeling well, no acute distress, alert and oriented x 3, calm.  His significant other is here in the room with him now and will be his sober .  I discussed the findings and follow-up with his primary care physician.  Patient to be discharged home in the care of his significant other.                    Past medical history/chonic conditions affecting care   has a past medical history of Trichimoniasis.     Social History     Tobacco Use    Smoking status: Every Day     Types: Cigars    Smokeless tobacco: Never   Vaping Use    Vaping Use: Never used   Substance Use Topics    Alcohol use: Yes     Alcohol/week: 3.0 standard drinks of alcohol     Types: 1 Glasses of wine, 1 Cans of beer, 1 Shots of liquor per week    Drug use: No                 Final impression  1. Acute alcoholic intoxication without complication (HCC)    2. Cannabis use, uncomplicated          Disposition/plan  DISPOSITION Decision To Discharge 03/31/2024 12:12:22 PM  Patient agrees with the plan, states their verbal understanding, and has all questions answered.    PATIENT REFERRED TO:  PCP referral line    Call in 3 days  As needed, Physician referral line: